# Patient Record
Sex: FEMALE | Race: ASIAN
[De-identification: names, ages, dates, MRNs, and addresses within clinical notes are randomized per-mention and may not be internally consistent; named-entity substitution may affect disease eponyms.]

---

## 2017-01-03 ENCOUNTER — HOSPITAL ENCOUNTER (OUTPATIENT)
Dept: HOSPITAL 67 - LAB | Age: 77
Discharge: HOME | End: 2017-01-03
Attending: FAMILY MEDICINE
Payer: COMMERCIAL

## 2017-01-03 DIAGNOSIS — Z79.01: Primary | ICD-10-CM

## 2017-01-03 DIAGNOSIS — Z51.81: ICD-10-CM

## 2017-01-03 PROCEDURE — 85610 PROTHROMBIN TIME: CPT

## 2017-07-20 ENCOUNTER — HOSPITAL ENCOUNTER (INPATIENT)
Dept: HOSPITAL 67 - ED | Age: 77
LOS: 13 days | Discharge: NURSING, REHAB OR LONG-TERM CARE | DRG: 871 | End: 2017-08-02
Attending: INTERNAL MEDICINE | Admitting: EMERGENCY MEDICINE
Payer: COMMERCIAL

## 2017-07-20 ENCOUNTER — HOSPITAL ENCOUNTER (OUTPATIENT)
Dept: HOSPITAL 67 - AMB | Age: 77
Discharge: ANOTHER HOSPITAL | End: 2017-07-20
Payer: COMMERCIAL

## 2017-07-20 VITALS — SYSTOLIC BLOOD PRESSURE: 161 MMHG | DIASTOLIC BLOOD PRESSURE: 67 MMHG

## 2017-07-20 VITALS — SYSTOLIC BLOOD PRESSURE: 166 MMHG | DIASTOLIC BLOOD PRESSURE: 69 MMHG | TEMPERATURE: 99.9 F

## 2017-07-20 VITALS
HEIGHT: 63 IN | BODY MASS INDEX: 51.91 KG/M2 | BODY MASS INDEX: 51.91 KG/M2 | WEIGHT: 293 LBS | WEIGHT: 293 LBS | HEIGHT: 63 IN

## 2017-07-20 VITALS — SYSTOLIC BLOOD PRESSURE: 156 MMHG | TEMPERATURE: 98.9 F | DIASTOLIC BLOOD PRESSURE: 69 MMHG

## 2017-07-20 VITALS — DIASTOLIC BLOOD PRESSURE: 67 MMHG | SYSTOLIC BLOOD PRESSURE: 127 MMHG

## 2017-07-20 VITALS — SYSTOLIC BLOOD PRESSURE: 139 MMHG | DIASTOLIC BLOOD PRESSURE: 64 MMHG

## 2017-07-20 VITALS — TEMPERATURE: 98.5 F | SYSTOLIC BLOOD PRESSURE: 133 MMHG | DIASTOLIC BLOOD PRESSURE: 58 MMHG

## 2017-07-20 VITALS — DIASTOLIC BLOOD PRESSURE: 55 MMHG | SYSTOLIC BLOOD PRESSURE: 157 MMHG

## 2017-07-20 VITALS — DIASTOLIC BLOOD PRESSURE: 65 MMHG | SYSTOLIC BLOOD PRESSURE: 105 MMHG

## 2017-07-20 VITALS — TEMPERATURE: 98.9 F | SYSTOLIC BLOOD PRESSURE: 126 MMHG | DIASTOLIC BLOOD PRESSURE: 40 MMHG

## 2017-07-20 VITALS — DIASTOLIC BLOOD PRESSURE: 72 MMHG | SYSTOLIC BLOOD PRESSURE: 91 MMHG

## 2017-07-20 VITALS — DIASTOLIC BLOOD PRESSURE: 50 MMHG | SYSTOLIC BLOOD PRESSURE: 114 MMHG

## 2017-07-20 VITALS — DIASTOLIC BLOOD PRESSURE: 59 MMHG | SYSTOLIC BLOOD PRESSURE: 148 MMHG

## 2017-07-20 VITALS — DIASTOLIC BLOOD PRESSURE: 59 MMHG | SYSTOLIC BLOOD PRESSURE: 132 MMHG

## 2017-07-20 VITALS — SYSTOLIC BLOOD PRESSURE: 126 MMHG | DIASTOLIC BLOOD PRESSURE: 40 MMHG

## 2017-07-20 VITALS — SYSTOLIC BLOOD PRESSURE: 159 MMHG | DIASTOLIC BLOOD PRESSURE: 62 MMHG

## 2017-07-20 VITALS — DIASTOLIC BLOOD PRESSURE: 81 MMHG | TEMPERATURE: 98.5 F | SYSTOLIC BLOOD PRESSURE: 135 MMHG

## 2017-07-20 VITALS — DIASTOLIC BLOOD PRESSURE: 55 MMHG | SYSTOLIC BLOOD PRESSURE: 154 MMHG

## 2017-07-20 VITALS — SYSTOLIC BLOOD PRESSURE: 119 MMHG | DIASTOLIC BLOOD PRESSURE: 50 MMHG

## 2017-07-20 VITALS — DIASTOLIC BLOOD PRESSURE: 81 MMHG | SYSTOLIC BLOOD PRESSURE: 144 MMHG

## 2017-07-20 DIAGNOSIS — J96.02: ICD-10-CM

## 2017-07-20 DIAGNOSIS — E87.4: ICD-10-CM

## 2017-07-20 DIAGNOSIS — R53.1: Primary | ICD-10-CM

## 2017-07-20 DIAGNOSIS — L03.116: ICD-10-CM

## 2017-07-20 DIAGNOSIS — D72.828: ICD-10-CM

## 2017-07-20 DIAGNOSIS — E83.42: ICD-10-CM

## 2017-07-20 DIAGNOSIS — I12.9: ICD-10-CM

## 2017-07-20 DIAGNOSIS — E11.40: ICD-10-CM

## 2017-07-20 DIAGNOSIS — N18.3: ICD-10-CM

## 2017-07-20 DIAGNOSIS — E87.5: ICD-10-CM

## 2017-07-20 DIAGNOSIS — L03.115: ICD-10-CM

## 2017-07-20 DIAGNOSIS — I50.9: ICD-10-CM

## 2017-07-20 DIAGNOSIS — M15.8: ICD-10-CM

## 2017-07-20 DIAGNOSIS — N17.8: ICD-10-CM

## 2017-07-20 DIAGNOSIS — E66.01: ICD-10-CM

## 2017-07-20 DIAGNOSIS — I89.0: ICD-10-CM

## 2017-07-20 DIAGNOSIS — B37.2: ICD-10-CM

## 2017-07-20 DIAGNOSIS — D64.89: ICD-10-CM

## 2017-07-20 DIAGNOSIS — M62.82: ICD-10-CM

## 2017-07-20 DIAGNOSIS — E88.09: ICD-10-CM

## 2017-07-20 DIAGNOSIS — E21.2: ICD-10-CM

## 2017-07-20 DIAGNOSIS — E03.8: ICD-10-CM

## 2017-07-20 DIAGNOSIS — A41.89: Primary | ICD-10-CM

## 2017-07-20 LAB
PLATELET # BLD: 246 K/UL (ref 152–353)
POTASSIUM SERPL-SCNC: 6.4 MMOL/L (ref 3.6–5.2)
SODIUM SERPL-SCNC: 134 MMOL/L (ref 136–145)

## 2017-07-20 PROCEDURE — 82728 ASSAY OF FERRITIN: CPT

## 2017-07-20 PROCEDURE — 84100 ASSAY OF PHOSPHORUS: CPT

## 2017-07-20 PROCEDURE — 87088 URINE BACTERIA CULTURE: CPT

## 2017-07-20 PROCEDURE — 83880 ASSAY OF NATRIURETIC PEPTIDE: CPT

## 2017-07-20 PROCEDURE — 84484 ASSAY OF TROPONIN QUANT: CPT

## 2017-07-20 PROCEDURE — 84165 PROTEIN E-PHORESIS SERUM: CPT

## 2017-07-20 PROCEDURE — 82330 ASSAY OF CALCIUM: CPT

## 2017-07-20 PROCEDURE — 84300 ASSAY OF URINE SODIUM: CPT

## 2017-07-20 PROCEDURE — 36415 COLL VENOUS BLD VENIPUNCTURE: CPT

## 2017-07-20 PROCEDURE — 86901 BLOOD TYPING SEROLOGIC RH(D): CPT

## 2017-07-20 PROCEDURE — 96366 THER/PROPH/DIAG IV INF ADDON: CPT

## 2017-07-20 PROCEDURE — 85027 COMPLETE CBC AUTOMATED: CPT

## 2017-07-20 PROCEDURE — 83550 IRON BINDING TEST: CPT

## 2017-07-20 PROCEDURE — 82550 ASSAY OF CK (CPK): CPT

## 2017-07-20 PROCEDURE — P9047 ALBUMIN (HUMAN), 25%, 50ML: HCPCS

## 2017-07-20 PROCEDURE — 96372 THER/PROPH/DIAG INJ SC/IM: CPT

## 2017-07-20 PROCEDURE — 82553 CREATINE MB FRACTION: CPT

## 2017-07-20 PROCEDURE — 36591 DRAW BLOOD OFF VENOUS DEVICE: CPT

## 2017-07-20 PROCEDURE — P9016 RBC LEUKOCYTES REDUCED: HCPCS

## 2017-07-20 PROCEDURE — 99284 EMERGENCY DEPT VISIT MOD MDM: CPT

## 2017-07-20 PROCEDURE — 82962 GLUCOSE BLOOD TEST: CPT

## 2017-07-20 PROCEDURE — 83735 ASSAY OF MAGNESIUM: CPT

## 2017-07-20 PROCEDURE — 82805 BLOOD GASES W/O2 SATURATION: CPT

## 2017-07-20 PROCEDURE — 85730 THROMBOPLASTIN TIME PARTIAL: CPT

## 2017-07-20 PROCEDURE — 87040 BLOOD CULTURE FOR BACTERIA: CPT

## 2017-07-20 PROCEDURE — 82232 ASSAY OF BETA-2 PROTEIN: CPT

## 2017-07-20 PROCEDURE — 87077 CULTURE AEROBIC IDENTIFY: CPT

## 2017-07-20 PROCEDURE — 82570 ASSAY OF URINE CREATININE: CPT

## 2017-07-20 PROCEDURE — C1751 CATH, INF, PER/CENT/MIDLINE: HCPCS

## 2017-07-20 PROCEDURE — 94760 N-INVAS EAR/PLS OXIMETRY 1: CPT

## 2017-07-20 PROCEDURE — A0427 ALS1-EMERGENCY: HCPCS

## 2017-07-20 PROCEDURE — 80048 BASIC METABOLIC PNL TOTAL CA: CPT

## 2017-07-20 PROCEDURE — 82948 REAGENT STRIP/BLOOD GLUCOSE: CPT

## 2017-07-20 PROCEDURE — 82607 VITAMIN B-12: CPT

## 2017-07-20 PROCEDURE — 84443 ASSAY THYROID STIM HORMONE: CPT

## 2017-07-20 PROCEDURE — 84540 ASSAY OF URINE/UREA-N: CPT

## 2017-07-20 PROCEDURE — 82043 UR ALBUMIN QUANTITATIVE: CPT

## 2017-07-20 PROCEDURE — 83540 ASSAY OF IRON: CPT

## 2017-07-20 PROCEDURE — 93005 ELECTROCARDIOGRAM TRACING: CPT

## 2017-07-20 PROCEDURE — 82747 ASSAY OF FOLIC ACID RBC: CPT

## 2017-07-20 PROCEDURE — 82272 OCCULT BLD FECES 1-3 TESTS: CPT

## 2017-07-20 PROCEDURE — 96365 THER/PROPH/DIAG IV INF INIT: CPT

## 2017-07-20 PROCEDURE — 93306 TTE W/DOPPLER COMPLETE: CPT

## 2017-07-20 PROCEDURE — 86850 RBC ANTIBODY SCREEN: CPT

## 2017-07-20 PROCEDURE — 87186 SC STD MICRODIL/AGAR DIL: CPT

## 2017-07-20 PROCEDURE — 84166 PROTEIN E-PHORESIS/URINE/CSF: CPT

## 2017-07-20 PROCEDURE — 86318 IA INFECTIOUS AGENT ANTIBODY: CPT

## 2017-07-20 PROCEDURE — 36600 WITHDRAWAL OF ARTERIAL BLOOD: CPT

## 2017-07-20 PROCEDURE — 85610 PROTHROMBIN TIME: CPT

## 2017-07-20 PROCEDURE — 86922 COMPATIBILITY TEST ANTIGLOB: CPT

## 2017-07-20 PROCEDURE — 83605 ASSAY OF LACTIC ACID: CPT

## 2017-07-20 PROCEDURE — 86900 BLOOD TYPING SEROLOGIC ABO: CPT

## 2017-07-20 PROCEDURE — 87205 SMEAR GRAM STAIN: CPT

## 2017-07-20 PROCEDURE — 87185 SC STD ENZYME DETCJ PER NZM: CPT

## 2017-07-20 PROCEDURE — 83970 ASSAY OF PARATHORMONE: CPT

## 2017-07-20 PROCEDURE — 82306 VITAMIN D 25 HYDROXY: CPT

## 2017-07-20 PROCEDURE — 81000 URINALYSIS NONAUTO W/SCOPE: CPT

## 2017-07-20 PROCEDURE — 80053 COMPREHEN METABOLIC PANEL: CPT

## 2017-07-20 PROCEDURE — A0425 GROUND MILEAGE: HCPCS

## 2017-07-21 VITALS — DIASTOLIC BLOOD PRESSURE: 64 MMHG | TEMPERATURE: 99.1 F | SYSTOLIC BLOOD PRESSURE: 186 MMHG

## 2017-07-21 VITALS — DIASTOLIC BLOOD PRESSURE: 54 MMHG | SYSTOLIC BLOOD PRESSURE: 183 MMHG

## 2017-07-21 VITALS — DIASTOLIC BLOOD PRESSURE: 38 MMHG | SYSTOLIC BLOOD PRESSURE: 99 MMHG

## 2017-07-21 VITALS — DIASTOLIC BLOOD PRESSURE: 51 MMHG | SYSTOLIC BLOOD PRESSURE: 148 MMHG | TEMPERATURE: 98.6 F

## 2017-07-21 VITALS — TEMPERATURE: 98.3 F | DIASTOLIC BLOOD PRESSURE: 80 MMHG | SYSTOLIC BLOOD PRESSURE: 184 MMHG

## 2017-07-21 VITALS — SYSTOLIC BLOOD PRESSURE: 165 MMHG | TEMPERATURE: 100.1 F | DIASTOLIC BLOOD PRESSURE: 45 MMHG

## 2017-07-21 VITALS — DIASTOLIC BLOOD PRESSURE: 87 MMHG | SYSTOLIC BLOOD PRESSURE: 114 MMHG

## 2017-07-21 VITALS — DIASTOLIC BLOOD PRESSURE: 50 MMHG | SYSTOLIC BLOOD PRESSURE: 177 MMHG

## 2017-07-21 LAB
PLATELET # BLD: 189 K/UL (ref 152–353)
POTASSIUM SERPL-SCNC: 5.6 MMOL/L (ref 3.6–5.2)
SODIUM SERPL-SCNC: 134 MMOL/L (ref 136–145)

## 2017-07-22 VITALS — TEMPERATURE: 99.4 F | DIASTOLIC BLOOD PRESSURE: 52 MMHG | SYSTOLIC BLOOD PRESSURE: 157 MMHG

## 2017-07-22 VITALS — DIASTOLIC BLOOD PRESSURE: 60 MMHG | TEMPERATURE: 98.9 F | SYSTOLIC BLOOD PRESSURE: 171 MMHG

## 2017-07-22 VITALS — SYSTOLIC BLOOD PRESSURE: 152 MMHG | DIASTOLIC BLOOD PRESSURE: 64 MMHG | TEMPERATURE: 98.4 F

## 2017-07-22 VITALS — TEMPERATURE: 100.5 F | DIASTOLIC BLOOD PRESSURE: 56 MMHG | SYSTOLIC BLOOD PRESSURE: 175 MMHG

## 2017-07-22 VITALS — SYSTOLIC BLOOD PRESSURE: 162 MMHG | DIASTOLIC BLOOD PRESSURE: 60 MMHG | TEMPERATURE: 98.2 F

## 2017-07-22 VITALS — TEMPERATURE: 97.9 F | DIASTOLIC BLOOD PRESSURE: 67 MMHG | SYSTOLIC BLOOD PRESSURE: 151 MMHG

## 2017-07-22 LAB
PLATELET # BLD: 201 K/UL (ref 152–353)
POTASSIUM SERPL-SCNC: 4.6 MMOL/L (ref 3.6–5.2)
SODIUM SERPL-SCNC: 138 MMOL/L (ref 136–145)

## 2017-07-23 VITALS — DIASTOLIC BLOOD PRESSURE: 68 MMHG | SYSTOLIC BLOOD PRESSURE: 168 MMHG

## 2017-07-23 VITALS — TEMPERATURE: 100.6 F | DIASTOLIC BLOOD PRESSURE: 48 MMHG | SYSTOLIC BLOOD PRESSURE: 157 MMHG

## 2017-07-23 VITALS — DIASTOLIC BLOOD PRESSURE: 73 MMHG | SYSTOLIC BLOOD PRESSURE: 190 MMHG

## 2017-07-23 VITALS — TEMPERATURE: 98.9 F | DIASTOLIC BLOOD PRESSURE: 74 MMHG | SYSTOLIC BLOOD PRESSURE: 181 MMHG

## 2017-07-23 VITALS — SYSTOLIC BLOOD PRESSURE: 176 MMHG | TEMPERATURE: 98.9 F | DIASTOLIC BLOOD PRESSURE: 77 MMHG

## 2017-07-23 VITALS — SYSTOLIC BLOOD PRESSURE: 173 MMHG | DIASTOLIC BLOOD PRESSURE: 93 MMHG

## 2017-07-23 VITALS — DIASTOLIC BLOOD PRESSURE: 68 MMHG | SYSTOLIC BLOOD PRESSURE: 181 MMHG

## 2017-07-23 VITALS — SYSTOLIC BLOOD PRESSURE: 176 MMHG | DIASTOLIC BLOOD PRESSURE: 72 MMHG

## 2017-07-23 VITALS — DIASTOLIC BLOOD PRESSURE: 75 MMHG | SYSTOLIC BLOOD PRESSURE: 176 MMHG

## 2017-07-23 VITALS — SYSTOLIC BLOOD PRESSURE: 165 MMHG | DIASTOLIC BLOOD PRESSURE: 72 MMHG

## 2017-07-23 VITALS — SYSTOLIC BLOOD PRESSURE: 179 MMHG | DIASTOLIC BLOOD PRESSURE: 70 MMHG

## 2017-07-23 VITALS — TEMPERATURE: 100.8 F | SYSTOLIC BLOOD PRESSURE: 189 MMHG | DIASTOLIC BLOOD PRESSURE: 70 MMHG

## 2017-07-23 VITALS — DIASTOLIC BLOOD PRESSURE: 79 MMHG | SYSTOLIC BLOOD PRESSURE: 178 MMHG

## 2017-07-23 VITALS — DIASTOLIC BLOOD PRESSURE: 75 MMHG | SYSTOLIC BLOOD PRESSURE: 163 MMHG | TEMPERATURE: 99.3 F

## 2017-07-23 VITALS — DIASTOLIC BLOOD PRESSURE: 73 MMHG | TEMPERATURE: 100.6 F | SYSTOLIC BLOOD PRESSURE: 176 MMHG

## 2017-07-23 VITALS — DIASTOLIC BLOOD PRESSURE: 72 MMHG | SYSTOLIC BLOOD PRESSURE: 162 MMHG

## 2017-07-23 VITALS — SYSTOLIC BLOOD PRESSURE: 161 MMHG | DIASTOLIC BLOOD PRESSURE: 64 MMHG

## 2017-07-23 VITALS — SYSTOLIC BLOOD PRESSURE: 165 MMHG | DIASTOLIC BLOOD PRESSURE: 74 MMHG

## 2017-07-23 VITALS — SYSTOLIC BLOOD PRESSURE: 176 MMHG | DIASTOLIC BLOOD PRESSURE: 76 MMHG

## 2017-07-23 VITALS — DIASTOLIC BLOOD PRESSURE: 70 MMHG | SYSTOLIC BLOOD PRESSURE: 160 MMHG

## 2017-07-23 VITALS — SYSTOLIC BLOOD PRESSURE: 161 MMHG | DIASTOLIC BLOOD PRESSURE: 70 MMHG

## 2017-07-23 LAB
PLATELET # BLD: 250 K/UL (ref 152–353)
POTASSIUM SERPL-SCNC: 5 MMOL/L (ref 3.6–5.2)
SODIUM SERPL-SCNC: 137 MMOL/L (ref 136–145)

## 2017-07-24 VITALS — DIASTOLIC BLOOD PRESSURE: 71 MMHG | SYSTOLIC BLOOD PRESSURE: 201 MMHG | TEMPERATURE: 100 F

## 2017-07-24 VITALS — DIASTOLIC BLOOD PRESSURE: 93 MMHG | TEMPERATURE: 98.7 F | SYSTOLIC BLOOD PRESSURE: 184 MMHG

## 2017-07-24 VITALS — TEMPERATURE: 99.1 F | DIASTOLIC BLOOD PRESSURE: 90 MMHG | SYSTOLIC BLOOD PRESSURE: 174 MMHG

## 2017-07-24 VITALS — DIASTOLIC BLOOD PRESSURE: 79 MMHG | SYSTOLIC BLOOD PRESSURE: 120 MMHG

## 2017-07-24 VITALS — SYSTOLIC BLOOD PRESSURE: 193 MMHG | DIASTOLIC BLOOD PRESSURE: 74 MMHG

## 2017-07-24 VITALS — SYSTOLIC BLOOD PRESSURE: 187 MMHG | DIASTOLIC BLOOD PRESSURE: 69 MMHG

## 2017-07-24 VITALS — SYSTOLIC BLOOD PRESSURE: 182 MMHG | DIASTOLIC BLOOD PRESSURE: 76 MMHG

## 2017-07-24 VITALS — SYSTOLIC BLOOD PRESSURE: 190 MMHG | DIASTOLIC BLOOD PRESSURE: 76 MMHG

## 2017-07-24 VITALS — SYSTOLIC BLOOD PRESSURE: 171 MMHG | DIASTOLIC BLOOD PRESSURE: 77 MMHG

## 2017-07-24 VITALS — SYSTOLIC BLOOD PRESSURE: 182 MMHG | DIASTOLIC BLOOD PRESSURE: 70 MMHG

## 2017-07-24 VITALS — SYSTOLIC BLOOD PRESSURE: 193 MMHG | DIASTOLIC BLOOD PRESSURE: 85 MMHG | TEMPERATURE: 100 F

## 2017-07-24 VITALS — DIASTOLIC BLOOD PRESSURE: 68 MMHG | SYSTOLIC BLOOD PRESSURE: 181 MMHG | TEMPERATURE: 99.2 F

## 2017-07-24 VITALS — DIASTOLIC BLOOD PRESSURE: 93 MMHG | SYSTOLIC BLOOD PRESSURE: 192 MMHG

## 2017-07-24 VITALS — DIASTOLIC BLOOD PRESSURE: 68 MMHG | SYSTOLIC BLOOD PRESSURE: 181 MMHG

## 2017-07-24 VITALS — SYSTOLIC BLOOD PRESSURE: 203 MMHG | DIASTOLIC BLOOD PRESSURE: 97 MMHG

## 2017-07-24 VITALS — TEMPERATURE: 99.5 F | SYSTOLIC BLOOD PRESSURE: 187 MMHG | DIASTOLIC BLOOD PRESSURE: 76 MMHG

## 2017-07-24 VITALS — SYSTOLIC BLOOD PRESSURE: 168 MMHG | DIASTOLIC BLOOD PRESSURE: 84 MMHG

## 2017-07-24 LAB
APTT BLD: 41.2 SECONDS (ref 24.5–33.6)
PLATELET # BLD: 249 K/UL (ref 152–353)
POTASSIUM SERPL-SCNC: 4.7 MMOL/L (ref 3.6–5.2)
SODIUM SERPL-SCNC: 138 MMOL/L (ref 136–145)

## 2017-07-24 PROCEDURE — 02HV33Z INSERTION OF INFUSION DEVICE INTO SUPERIOR VENA CAVA, PERCUTANEOUS APPROACH: ICD-10-PCS

## 2017-07-25 VITALS — DIASTOLIC BLOOD PRESSURE: 77 MMHG | TEMPERATURE: 99 F | SYSTOLIC BLOOD PRESSURE: 179 MMHG

## 2017-07-25 VITALS — TEMPERATURE: 99.9 F | SYSTOLIC BLOOD PRESSURE: 162 MMHG | DIASTOLIC BLOOD PRESSURE: 61 MMHG

## 2017-07-25 VITALS — TEMPERATURE: 100 F | DIASTOLIC BLOOD PRESSURE: 75 MMHG | SYSTOLIC BLOOD PRESSURE: 194 MMHG

## 2017-07-25 VITALS — TEMPERATURE: 99.4 F | DIASTOLIC BLOOD PRESSURE: 72 MMHG | SYSTOLIC BLOOD PRESSURE: 199 MMHG

## 2017-07-25 VITALS — SYSTOLIC BLOOD PRESSURE: 161 MMHG | DIASTOLIC BLOOD PRESSURE: 98 MMHG

## 2017-07-25 VITALS — SYSTOLIC BLOOD PRESSURE: 183 MMHG | DIASTOLIC BLOOD PRESSURE: 77 MMHG

## 2017-07-25 VITALS — SYSTOLIC BLOOD PRESSURE: 179 MMHG | DIASTOLIC BLOOD PRESSURE: 78 MMHG | TEMPERATURE: 98.6 F

## 2017-07-25 VITALS — TEMPERATURE: 100 F | SYSTOLIC BLOOD PRESSURE: 188 MMHG | DIASTOLIC BLOOD PRESSURE: 70 MMHG

## 2017-07-25 VITALS — SYSTOLIC BLOOD PRESSURE: 177 MMHG | DIASTOLIC BLOOD PRESSURE: 70 MMHG

## 2017-07-25 VITALS — DIASTOLIC BLOOD PRESSURE: 70 MMHG | SYSTOLIC BLOOD PRESSURE: 175 MMHG | TEMPERATURE: 99.2 F

## 2017-07-25 VITALS — DIASTOLIC BLOOD PRESSURE: 77 MMHG | SYSTOLIC BLOOD PRESSURE: 179 MMHG

## 2017-07-25 VITALS — SYSTOLIC BLOOD PRESSURE: 174 MMHG | DIASTOLIC BLOOD PRESSURE: 88 MMHG

## 2017-07-25 LAB
PLATELET # BLD: 258 K/UL (ref 152–353)
POTASSIUM SERPL-SCNC: 4.1 MMOL/L (ref 3.6–5.2)
SODIUM SERPL-SCNC: 137 MMOL/L (ref 136–145)

## 2017-07-26 VITALS — SYSTOLIC BLOOD PRESSURE: 165 MMHG | DIASTOLIC BLOOD PRESSURE: 72 MMHG

## 2017-07-26 VITALS — DIASTOLIC BLOOD PRESSURE: 77 MMHG | SYSTOLIC BLOOD PRESSURE: 148 MMHG

## 2017-07-26 VITALS — SYSTOLIC BLOOD PRESSURE: 169 MMHG | DIASTOLIC BLOOD PRESSURE: 52 MMHG

## 2017-07-26 VITALS — DIASTOLIC BLOOD PRESSURE: 74 MMHG | SYSTOLIC BLOOD PRESSURE: 181 MMHG

## 2017-07-26 VITALS — SYSTOLIC BLOOD PRESSURE: 178 MMHG | TEMPERATURE: 100.1 F | DIASTOLIC BLOOD PRESSURE: 83 MMHG

## 2017-07-26 VITALS — TEMPERATURE: 100.2 F | SYSTOLIC BLOOD PRESSURE: 190 MMHG | DIASTOLIC BLOOD PRESSURE: 76 MMHG

## 2017-07-26 VITALS — SYSTOLIC BLOOD PRESSURE: 187 MMHG | DIASTOLIC BLOOD PRESSURE: 84 MMHG

## 2017-07-26 VITALS — SYSTOLIC BLOOD PRESSURE: 186 MMHG | DIASTOLIC BLOOD PRESSURE: 76 MMHG

## 2017-07-26 VITALS — DIASTOLIC BLOOD PRESSURE: 74 MMHG | SYSTOLIC BLOOD PRESSURE: 171 MMHG

## 2017-07-26 VITALS — SYSTOLIC BLOOD PRESSURE: 189 MMHG | DIASTOLIC BLOOD PRESSURE: 70 MMHG

## 2017-07-26 VITALS — DIASTOLIC BLOOD PRESSURE: 73 MMHG | SYSTOLIC BLOOD PRESSURE: 179 MMHG | TEMPERATURE: 99.6 F

## 2017-07-26 VITALS — SYSTOLIC BLOOD PRESSURE: 203 MMHG | DIASTOLIC BLOOD PRESSURE: 93 MMHG | TEMPERATURE: 100.4 F

## 2017-07-26 VITALS — DIASTOLIC BLOOD PRESSURE: 97 MMHG | SYSTOLIC BLOOD PRESSURE: 188 MMHG | TEMPERATURE: 99.9 F

## 2017-07-26 VITALS — SYSTOLIC BLOOD PRESSURE: 205 MMHG | DIASTOLIC BLOOD PRESSURE: 96 MMHG | TEMPERATURE: 100.4 F

## 2017-07-26 VITALS — TEMPERATURE: 99.5 F | SYSTOLIC BLOOD PRESSURE: 165 MMHG | DIASTOLIC BLOOD PRESSURE: 78 MMHG

## 2017-07-26 VITALS — SYSTOLIC BLOOD PRESSURE: 190 MMHG | DIASTOLIC BLOOD PRESSURE: 95 MMHG

## 2017-07-26 VITALS — DIASTOLIC BLOOD PRESSURE: 68 MMHG | SYSTOLIC BLOOD PRESSURE: 187 MMHG

## 2017-07-26 LAB
PLATELET # BLD: 275 K/UL (ref 152–353)
POTASSIUM SERPL-SCNC: 4.2 MMOL/L (ref 3.6–5.2)
SODIUM SERPL-SCNC: 138 MMOL/L (ref 136–145)

## 2017-07-26 PROCEDURE — 30243N1 TRANSFUSION OF NONAUTOLOGOUS RED BLOOD CELLS INTO CENTRAL VEIN, PERCUTANEOUS APPROACH: ICD-10-PCS | Performed by: INTERNAL MEDICINE

## 2017-07-27 VITALS — SYSTOLIC BLOOD PRESSURE: 105 MMHG | DIASTOLIC BLOOD PRESSURE: 78 MMHG

## 2017-07-27 VITALS — DIASTOLIC BLOOD PRESSURE: 68 MMHG | SYSTOLIC BLOOD PRESSURE: 160 MMHG

## 2017-07-27 VITALS — DIASTOLIC BLOOD PRESSURE: 77 MMHG | SYSTOLIC BLOOD PRESSURE: 192 MMHG

## 2017-07-27 VITALS — DIASTOLIC BLOOD PRESSURE: 81 MMHG | SYSTOLIC BLOOD PRESSURE: 187 MMHG

## 2017-07-27 VITALS — TEMPERATURE: 99.3 F | DIASTOLIC BLOOD PRESSURE: 71 MMHG | SYSTOLIC BLOOD PRESSURE: 194 MMHG

## 2017-07-27 VITALS — DIASTOLIC BLOOD PRESSURE: 84 MMHG | SYSTOLIC BLOOD PRESSURE: 184 MMHG | TEMPERATURE: 99.2 F

## 2017-07-27 VITALS — DIASTOLIC BLOOD PRESSURE: 73 MMHG | TEMPERATURE: 100.1 F | SYSTOLIC BLOOD PRESSURE: 178 MMHG

## 2017-07-27 VITALS — TEMPERATURE: 100.4 F | DIASTOLIC BLOOD PRESSURE: 73 MMHG | SYSTOLIC BLOOD PRESSURE: 183 MMHG

## 2017-07-27 VITALS — DIASTOLIC BLOOD PRESSURE: 84 MMHG | SYSTOLIC BLOOD PRESSURE: 210 MMHG

## 2017-07-27 VITALS — DIASTOLIC BLOOD PRESSURE: 72 MMHG | SYSTOLIC BLOOD PRESSURE: 192 MMHG

## 2017-07-27 VITALS — TEMPERATURE: 99.3 F | DIASTOLIC BLOOD PRESSURE: 89 MMHG | SYSTOLIC BLOOD PRESSURE: 106 MMHG

## 2017-07-27 VITALS — SYSTOLIC BLOOD PRESSURE: 141 MMHG | DIASTOLIC BLOOD PRESSURE: 126 MMHG

## 2017-07-27 VITALS — SYSTOLIC BLOOD PRESSURE: 174 MMHG | DIASTOLIC BLOOD PRESSURE: 93 MMHG

## 2017-07-27 VITALS — DIASTOLIC BLOOD PRESSURE: 91 MMHG | SYSTOLIC BLOOD PRESSURE: 179 MMHG

## 2017-07-27 VITALS — TEMPERATURE: 99 F | SYSTOLIC BLOOD PRESSURE: 160 MMHG | DIASTOLIC BLOOD PRESSURE: 79 MMHG

## 2017-07-27 VITALS — DIASTOLIC BLOOD PRESSURE: 94 MMHG | SYSTOLIC BLOOD PRESSURE: 198 MMHG

## 2017-07-27 VITALS — SYSTOLIC BLOOD PRESSURE: 146 MMHG | DIASTOLIC BLOOD PRESSURE: 90 MMHG

## 2017-07-27 VITALS — DIASTOLIC BLOOD PRESSURE: 63 MMHG | SYSTOLIC BLOOD PRESSURE: 106 MMHG

## 2017-07-27 VITALS — DIASTOLIC BLOOD PRESSURE: 83 MMHG | SYSTOLIC BLOOD PRESSURE: 186 MMHG

## 2017-07-27 VITALS — DIASTOLIC BLOOD PRESSURE: 89 MMHG | SYSTOLIC BLOOD PRESSURE: 196 MMHG

## 2017-07-27 VITALS — DIASTOLIC BLOOD PRESSURE: 78 MMHG | SYSTOLIC BLOOD PRESSURE: 168 MMHG

## 2017-07-27 LAB
PLATELET # BLD: 268 K/UL (ref 152–353)
POTASSIUM SERPL-SCNC: 3.6 MMOL/L (ref 3.6–5.2)
SODIUM SERPL-SCNC: 139 MMOL/L (ref 136–145)

## 2017-07-28 VITALS — TEMPERATURE: 97.8 F | SYSTOLIC BLOOD PRESSURE: 162 MMHG | DIASTOLIC BLOOD PRESSURE: 56 MMHG

## 2017-07-28 VITALS — DIASTOLIC BLOOD PRESSURE: 71 MMHG | SYSTOLIC BLOOD PRESSURE: 173 MMHG

## 2017-07-28 VITALS — SYSTOLIC BLOOD PRESSURE: 155 MMHG | DIASTOLIC BLOOD PRESSURE: 59 MMHG

## 2017-07-28 VITALS — SYSTOLIC BLOOD PRESSURE: 162 MMHG | DIASTOLIC BLOOD PRESSURE: 103 MMHG

## 2017-07-28 VITALS — DIASTOLIC BLOOD PRESSURE: 87 MMHG | SYSTOLIC BLOOD PRESSURE: 162 MMHG

## 2017-07-28 VITALS — DIASTOLIC BLOOD PRESSURE: 103 MMHG | SYSTOLIC BLOOD PRESSURE: 177 MMHG

## 2017-07-28 VITALS — TEMPERATURE: 96.5 F | DIASTOLIC BLOOD PRESSURE: 88 MMHG | SYSTOLIC BLOOD PRESSURE: 178 MMHG

## 2017-07-28 VITALS — SYSTOLIC BLOOD PRESSURE: 138 MMHG | DIASTOLIC BLOOD PRESSURE: 58 MMHG

## 2017-07-28 VITALS — SYSTOLIC BLOOD PRESSURE: 159 MMHG | DIASTOLIC BLOOD PRESSURE: 80 MMHG | TEMPERATURE: 100.6 F

## 2017-07-28 VITALS — DIASTOLIC BLOOD PRESSURE: 64 MMHG | SYSTOLIC BLOOD PRESSURE: 143 MMHG

## 2017-07-28 VITALS — DIASTOLIC BLOOD PRESSURE: 59 MMHG | SYSTOLIC BLOOD PRESSURE: 140 MMHG

## 2017-07-28 VITALS — DIASTOLIC BLOOD PRESSURE: 81 MMHG | TEMPERATURE: 100.1 F | SYSTOLIC BLOOD PRESSURE: 189 MMHG

## 2017-07-28 VITALS — SYSTOLIC BLOOD PRESSURE: 146 MMHG | DIASTOLIC BLOOD PRESSURE: 63 MMHG

## 2017-07-28 VITALS — DIASTOLIC BLOOD PRESSURE: 70 MMHG | SYSTOLIC BLOOD PRESSURE: 157 MMHG | TEMPERATURE: 97 F

## 2017-07-28 VITALS — DIASTOLIC BLOOD PRESSURE: 68 MMHG | SYSTOLIC BLOOD PRESSURE: 106 MMHG

## 2017-07-28 VITALS — SYSTOLIC BLOOD PRESSURE: 140 MMHG | DIASTOLIC BLOOD PRESSURE: 64 MMHG

## 2017-07-28 VITALS — SYSTOLIC BLOOD PRESSURE: 171 MMHG | DIASTOLIC BLOOD PRESSURE: 62 MMHG

## 2017-07-28 VITALS — SYSTOLIC BLOOD PRESSURE: 162 MMHG | DIASTOLIC BLOOD PRESSURE: 58 MMHG

## 2017-07-28 VITALS — SYSTOLIC BLOOD PRESSURE: 168 MMHG | DIASTOLIC BLOOD PRESSURE: 69 MMHG

## 2017-07-28 VITALS — SYSTOLIC BLOOD PRESSURE: 184 MMHG | DIASTOLIC BLOOD PRESSURE: 99 MMHG

## 2017-07-28 LAB
PLATELET # BLD: 277 K/UL (ref 152–353)
POTASSIUM SERPL-SCNC: 3.3 MMOL/L (ref 3.6–5.2)
SODIUM SERPL-SCNC: 145 MMOL/L (ref 136–145)

## 2017-07-29 VITALS — DIASTOLIC BLOOD PRESSURE: 60 MMHG | SYSTOLIC BLOOD PRESSURE: 145 MMHG

## 2017-07-29 VITALS — SYSTOLIC BLOOD PRESSURE: 143 MMHG | DIASTOLIC BLOOD PRESSURE: 90 MMHG

## 2017-07-29 VITALS — DIASTOLIC BLOOD PRESSURE: 76 MMHG | SYSTOLIC BLOOD PRESSURE: 178 MMHG

## 2017-07-29 VITALS — DIASTOLIC BLOOD PRESSURE: 64 MMHG | SYSTOLIC BLOOD PRESSURE: 160 MMHG

## 2017-07-29 VITALS — SYSTOLIC BLOOD PRESSURE: 175 MMHG | TEMPERATURE: 98 F | DIASTOLIC BLOOD PRESSURE: 89 MMHG

## 2017-07-29 VITALS — DIASTOLIC BLOOD PRESSURE: 65 MMHG | SYSTOLIC BLOOD PRESSURE: 171 MMHG | TEMPERATURE: 99 F

## 2017-07-29 VITALS — SYSTOLIC BLOOD PRESSURE: 182 MMHG | DIASTOLIC BLOOD PRESSURE: 83 MMHG

## 2017-07-29 VITALS — SYSTOLIC BLOOD PRESSURE: 149 MMHG | DIASTOLIC BLOOD PRESSURE: 77 MMHG

## 2017-07-29 VITALS — SYSTOLIC BLOOD PRESSURE: 185 MMHG | DIASTOLIC BLOOD PRESSURE: 73 MMHG

## 2017-07-29 VITALS — TEMPERATURE: 98.8 F | DIASTOLIC BLOOD PRESSURE: 65 MMHG | SYSTOLIC BLOOD PRESSURE: 182 MMHG

## 2017-07-29 VITALS — SYSTOLIC BLOOD PRESSURE: 150 MMHG | DIASTOLIC BLOOD PRESSURE: 57 MMHG

## 2017-07-29 VITALS — SYSTOLIC BLOOD PRESSURE: 173 MMHG | DIASTOLIC BLOOD PRESSURE: 76 MMHG

## 2017-07-29 VITALS — SYSTOLIC BLOOD PRESSURE: 180 MMHG | DIASTOLIC BLOOD PRESSURE: 78 MMHG

## 2017-07-29 VITALS — DIASTOLIC BLOOD PRESSURE: 67 MMHG | SYSTOLIC BLOOD PRESSURE: 175 MMHG

## 2017-07-29 VITALS — SYSTOLIC BLOOD PRESSURE: 158 MMHG | DIASTOLIC BLOOD PRESSURE: 63 MMHG

## 2017-07-29 VITALS — DIASTOLIC BLOOD PRESSURE: 68 MMHG | SYSTOLIC BLOOD PRESSURE: 164 MMHG

## 2017-07-29 VITALS — DIASTOLIC BLOOD PRESSURE: 76 MMHG | SYSTOLIC BLOOD PRESSURE: 170 MMHG

## 2017-07-29 VITALS — DIASTOLIC BLOOD PRESSURE: 80 MMHG | SYSTOLIC BLOOD PRESSURE: 163 MMHG

## 2017-07-29 VITALS — TEMPERATURE: 99 F | SYSTOLIC BLOOD PRESSURE: 167 MMHG | DIASTOLIC BLOOD PRESSURE: 61 MMHG

## 2017-07-29 VITALS — SYSTOLIC BLOOD PRESSURE: 165 MMHG | DIASTOLIC BLOOD PRESSURE: 80 MMHG

## 2017-07-29 VITALS — DIASTOLIC BLOOD PRESSURE: 80 MMHG | SYSTOLIC BLOOD PRESSURE: 182 MMHG

## 2017-07-29 LAB
PLATELET # BLD: 255 K/UL (ref 152–353)
POTASSIUM SERPL-SCNC: 3.1 MMOL/L (ref 3.6–5.2)
SODIUM SERPL-SCNC: 144 MMOL/L (ref 136–145)

## 2017-07-30 VITALS — TEMPERATURE: 98.7 F | SYSTOLIC BLOOD PRESSURE: 183 MMHG | DIASTOLIC BLOOD PRESSURE: 59 MMHG

## 2017-07-30 VITALS — TEMPERATURE: 98.7 F | SYSTOLIC BLOOD PRESSURE: 178 MMHG | DIASTOLIC BLOOD PRESSURE: 85 MMHG

## 2017-07-30 VITALS — TEMPERATURE: 98.4 F | DIASTOLIC BLOOD PRESSURE: 66 MMHG | SYSTOLIC BLOOD PRESSURE: 191 MMHG

## 2017-07-30 VITALS — DIASTOLIC BLOOD PRESSURE: 75 MMHG | SYSTOLIC BLOOD PRESSURE: 179 MMHG

## 2017-07-30 VITALS — DIASTOLIC BLOOD PRESSURE: 66 MMHG | TEMPERATURE: 99.1 F | SYSTOLIC BLOOD PRESSURE: 169 MMHG

## 2017-07-30 VITALS — DIASTOLIC BLOOD PRESSURE: 57 MMHG | TEMPERATURE: 98.4 F | SYSTOLIC BLOOD PRESSURE: 157 MMHG

## 2017-07-30 VITALS — SYSTOLIC BLOOD PRESSURE: 173 MMHG | DIASTOLIC BLOOD PRESSURE: 89 MMHG

## 2017-07-30 VITALS — SYSTOLIC BLOOD PRESSURE: 169 MMHG | DIASTOLIC BLOOD PRESSURE: 66 MMHG

## 2017-07-30 VITALS — SYSTOLIC BLOOD PRESSURE: 139 MMHG | DIASTOLIC BLOOD PRESSURE: 76 MMHG

## 2017-07-30 LAB
PLATELET # BLD: 299 K/UL (ref 152–353)
POTASSIUM SERPL-SCNC: 3.1 MMOL/L (ref 3.6–5.2)
SODIUM SERPL-SCNC: 142 MMOL/L (ref 136–145)

## 2017-07-31 VITALS — DIASTOLIC BLOOD PRESSURE: 55 MMHG | SYSTOLIC BLOOD PRESSURE: 156 MMHG | TEMPERATURE: 99.4 F

## 2017-07-31 VITALS — DIASTOLIC BLOOD PRESSURE: 63 MMHG | TEMPERATURE: 99.3 F | SYSTOLIC BLOOD PRESSURE: 184 MMHG

## 2017-07-31 VITALS — SYSTOLIC BLOOD PRESSURE: 168 MMHG | DIASTOLIC BLOOD PRESSURE: 59 MMHG | TEMPERATURE: 99.2 F

## 2017-07-31 VITALS — TEMPERATURE: 98.8 F | SYSTOLIC BLOOD PRESSURE: 174 MMHG | DIASTOLIC BLOOD PRESSURE: 52 MMHG

## 2017-07-31 VITALS — SYSTOLIC BLOOD PRESSURE: 182 MMHG | DIASTOLIC BLOOD PRESSURE: 61 MMHG | TEMPERATURE: 98.9 F

## 2017-07-31 VITALS — DIASTOLIC BLOOD PRESSURE: 74 MMHG | SYSTOLIC BLOOD PRESSURE: 187 MMHG | TEMPERATURE: 99.3 F

## 2017-07-31 LAB
PLATELET # BLD: 284 K/UL (ref 152–353)
POTASSIUM SERPL-SCNC: 3 MMOL/L (ref 3.6–5.2)
SODIUM SERPL-SCNC: 142 MMOL/L (ref 136–145)

## 2017-08-01 VITALS — SYSTOLIC BLOOD PRESSURE: 168 MMHG | DIASTOLIC BLOOD PRESSURE: 57 MMHG | TEMPERATURE: 99.1 F

## 2017-08-01 VITALS — SYSTOLIC BLOOD PRESSURE: 159 MMHG | DIASTOLIC BLOOD PRESSURE: 66 MMHG | TEMPERATURE: 98 F

## 2017-08-01 VITALS — SYSTOLIC BLOOD PRESSURE: 192 MMHG | TEMPERATURE: 97.8 F | DIASTOLIC BLOOD PRESSURE: 97 MMHG

## 2017-08-01 VITALS — DIASTOLIC BLOOD PRESSURE: 62 MMHG | SYSTOLIC BLOOD PRESSURE: 190 MMHG | TEMPERATURE: 99.4 F

## 2017-08-01 VITALS — DIASTOLIC BLOOD PRESSURE: 72 MMHG | TEMPERATURE: 98 F | SYSTOLIC BLOOD PRESSURE: 136 MMHG

## 2017-08-01 VITALS — SYSTOLIC BLOOD PRESSURE: 183 MMHG | TEMPERATURE: 98.9 F | DIASTOLIC BLOOD PRESSURE: 58 MMHG

## 2017-08-01 LAB
PLATELET # BLD: 336 K/UL (ref 152–353)
POTASSIUM SERPL-SCNC: 2.9 MMOL/L (ref 3.6–5.2)
SODIUM SERPL-SCNC: 142 MMOL/L (ref 136–145)

## 2017-08-02 ENCOUNTER — HOSPITAL ENCOUNTER (INPATIENT)
Dept: HOSPITAL 67 - MED/SURG | Age: 77
LOS: 7 days | Discharge: HOME HEALTH | DRG: 556 | End: 2017-08-09
Attending: INTERNAL MEDICINE | Admitting: INTERNAL MEDICINE
Payer: COMMERCIAL

## 2017-08-02 VITALS
SYSTOLIC BLOOD PRESSURE: 161 MMHG | SYSTOLIC BLOOD PRESSURE: 113 MMHG | DIASTOLIC BLOOD PRESSURE: 69 MMHG | TEMPERATURE: 99.1 F | TEMPERATURE: 99.9 F | DIASTOLIC BLOOD PRESSURE: 59 MMHG

## 2017-08-02 VITALS — TEMPERATURE: 98 F | SYSTOLIC BLOOD PRESSURE: 190 MMHG | DIASTOLIC BLOOD PRESSURE: 65 MMHG

## 2017-08-02 VITALS
BODY MASS INDEX: 51.91 KG/M2 | BODY MASS INDEX: 51.91 KG/M2 | WEIGHT: 293 LBS | HEIGHT: 63 IN | WEIGHT: 293 LBS | HEIGHT: 63 IN

## 2017-08-02 VITALS — SYSTOLIC BLOOD PRESSURE: 198 MMHG | DIASTOLIC BLOOD PRESSURE: 77 MMHG | TEMPERATURE: 97.8 F

## 2017-08-02 VITALS — TEMPERATURE: 99.6 F | DIASTOLIC BLOOD PRESSURE: 64 MMHG | SYSTOLIC BLOOD PRESSURE: 142 MMHG

## 2017-08-02 VITALS — TEMPERATURE: 98.3 F | DIASTOLIC BLOOD PRESSURE: 76 MMHG | SYSTOLIC BLOOD PRESSURE: 153 MMHG

## 2017-08-02 DIAGNOSIS — I89.0: ICD-10-CM

## 2017-08-02 DIAGNOSIS — M62.81: Primary | ICD-10-CM

## 2017-08-02 DIAGNOSIS — L03.115: ICD-10-CM

## 2017-08-02 DIAGNOSIS — I50.9: ICD-10-CM

## 2017-08-02 DIAGNOSIS — N18.3: ICD-10-CM

## 2017-08-02 DIAGNOSIS — L03.116: ICD-10-CM

## 2017-08-02 LAB
PLATELET # BLD: 337 K/UL (ref 152–353)
POTASSIUM SERPL-SCNC: 3.1 MMOL/L (ref 3.6–5.2)
SODIUM SERPL-SCNC: 143 MMOL/L (ref 136–145)

## 2017-08-02 PROCEDURE — 83735 ASSAY OF MAGNESIUM: CPT

## 2017-08-02 PROCEDURE — 82948 REAGENT STRIP/BLOOD GLUCOSE: CPT

## 2017-08-02 PROCEDURE — 85027 COMPLETE CBC AUTOMATED: CPT

## 2017-08-02 PROCEDURE — 36415 COLL VENOUS BLD VENIPUNCTURE: CPT

## 2017-08-02 PROCEDURE — 94760 N-INVAS EAR/PLS OXIMETRY 1: CPT

## 2017-08-02 PROCEDURE — 85610 PROTHROMBIN TIME: CPT

## 2017-08-02 PROCEDURE — 80053 COMPREHEN METABOLIC PANEL: CPT

## 2017-08-03 VITALS — SYSTOLIC BLOOD PRESSURE: 3 MMHG | TEMPERATURE: 99.1 F | DIASTOLIC BLOOD PRESSURE: 69 MMHG

## 2017-08-03 VITALS — DIASTOLIC BLOOD PRESSURE: 70 MMHG | TEMPERATURE: 98.4 F | SYSTOLIC BLOOD PRESSURE: 185 MMHG

## 2017-08-04 VITALS — SYSTOLIC BLOOD PRESSURE: 171 MMHG | TEMPERATURE: 98.4 F | DIASTOLIC BLOOD PRESSURE: 75 MMHG

## 2017-08-04 VITALS — SYSTOLIC BLOOD PRESSURE: 179 MMHG | DIASTOLIC BLOOD PRESSURE: 64 MMHG | TEMPERATURE: 99 F

## 2017-08-05 VITALS — SYSTOLIC BLOOD PRESSURE: 174 MMHG | DIASTOLIC BLOOD PRESSURE: 63 MMHG | TEMPERATURE: 98.8 F

## 2017-08-05 VITALS — TEMPERATURE: 98.7 F | DIASTOLIC BLOOD PRESSURE: 65 MMHG | SYSTOLIC BLOOD PRESSURE: 192 MMHG

## 2017-08-07 VITALS — SYSTOLIC BLOOD PRESSURE: 180 MMHG | DIASTOLIC BLOOD PRESSURE: 58 MMHG | TEMPERATURE: 99 F

## 2017-08-07 VITALS — SYSTOLIC BLOOD PRESSURE: 165 MMHG | DIASTOLIC BLOOD PRESSURE: 50 MMHG | TEMPERATURE: 98.6 F

## 2017-08-07 LAB
PLATELET # BLD: 335 K/UL (ref 152–353)
POTASSIUM SERPL-SCNC: 5.4 MMOL/L (ref 3.6–5.2)
SODIUM SERPL-SCNC: 135 MMOL/L (ref 136–145)

## 2017-08-08 VITALS — DIASTOLIC BLOOD PRESSURE: 77 MMHG | TEMPERATURE: 98.5 F | SYSTOLIC BLOOD PRESSURE: 134 MMHG

## 2017-08-08 VITALS — DIASTOLIC BLOOD PRESSURE: 68 MMHG | SYSTOLIC BLOOD PRESSURE: 176 MMHG | TEMPERATURE: 98.6 F

## 2017-08-09 LAB
PLATELET # BLD: 281 K/UL (ref 152–353)
POTASSIUM SERPL-SCNC: 4.7 MMOL/L (ref 3.6–5.2)
SODIUM SERPL-SCNC: 139 MMOL/L (ref 136–145)

## 2017-08-11 ENCOUNTER — HOSPITAL ENCOUNTER (OUTPATIENT)
Dept: HOSPITAL 67 - LAB | Age: 77
Discharge: HOME | End: 2017-08-11
Attending: FAMILY MEDICINE
Payer: COMMERCIAL

## 2017-08-11 DIAGNOSIS — N18.3: ICD-10-CM

## 2017-08-11 DIAGNOSIS — Z79.01: Primary | ICD-10-CM

## 2017-08-11 DIAGNOSIS — E87.5: ICD-10-CM

## 2017-08-11 DIAGNOSIS — E11.9: ICD-10-CM

## 2017-08-11 PROCEDURE — 83735 ASSAY OF MAGNESIUM: CPT

## 2017-08-11 PROCEDURE — 80048 BASIC METABOLIC PNL TOTAL CA: CPT

## 2017-08-11 PROCEDURE — 85014 HEMATOCRIT: CPT

## 2017-08-11 PROCEDURE — 85610 PROTHROMBIN TIME: CPT

## 2017-08-11 PROCEDURE — 85018 HEMOGLOBIN: CPT

## 2017-08-12 LAB
POTASSIUM SERPL-SCNC: 4.5 MMOL/L (ref 3.6–5.2)
SODIUM SERPL-SCNC: 135 MMOL/L (ref 136–145)

## 2017-10-05 ENCOUNTER — HOSPITAL ENCOUNTER (OUTPATIENT)
Dept: HOSPITAL 67 - AMB | Age: 77
Discharge: ANOTHER HOSPITAL | End: 2017-10-05
Payer: COMMERCIAL

## 2017-10-05 ENCOUNTER — HOSPITAL ENCOUNTER (INPATIENT)
Dept: HOSPITAL 67 - ED | Age: 77
LOS: 3 days | Discharge: HOME | DRG: 602 | End: 2017-10-08
Payer: COMMERCIAL

## 2017-10-05 VITALS
WEIGHT: 293 LBS | HEIGHT: 63 IN | BODY MASS INDEX: 51.91 KG/M2 | BODY MASS INDEX: 51.91 KG/M2 | HEIGHT: 63 IN | WEIGHT: 293 LBS

## 2017-10-05 VITALS — SYSTOLIC BLOOD PRESSURE: 224 MMHG | TEMPERATURE: 98.5 F | DIASTOLIC BLOOD PRESSURE: 83 MMHG

## 2017-10-05 VITALS — DIASTOLIC BLOOD PRESSURE: 54 MMHG | SYSTOLIC BLOOD PRESSURE: 167 MMHG | TEMPERATURE: 101.2 F

## 2017-10-05 DIAGNOSIS — R06.02: ICD-10-CM

## 2017-10-05 DIAGNOSIS — I50.33: ICD-10-CM

## 2017-10-05 DIAGNOSIS — L03.115: Primary | ICD-10-CM

## 2017-10-05 DIAGNOSIS — E87.5: ICD-10-CM

## 2017-10-05 DIAGNOSIS — B99.8: ICD-10-CM

## 2017-10-05 DIAGNOSIS — D64.89: ICD-10-CM

## 2017-10-05 DIAGNOSIS — E83.42: ICD-10-CM

## 2017-10-05 DIAGNOSIS — E11.9: ICD-10-CM

## 2017-10-05 DIAGNOSIS — R06.02: Primary | ICD-10-CM

## 2017-10-05 DIAGNOSIS — E66.01: ICD-10-CM

## 2017-10-05 DIAGNOSIS — R07.89: ICD-10-CM

## 2017-10-05 DIAGNOSIS — N18.4: ICD-10-CM

## 2017-10-05 LAB
APTT BLD: 43 SECONDS (ref 24.5–33.6)
PLATELET # BLD: 188 K/UL (ref 152–353)
POTASSIUM SERPL-SCNC: 5.8 MMOL/L (ref 3.6–5.2)
SODIUM SERPL-SCNC: 130 MMOL/L (ref 136–145)

## 2017-10-05 PROCEDURE — 82550 ASSAY OF CK (CPK): CPT

## 2017-10-05 PROCEDURE — 86922 COMPATIBILITY TEST ANTIGLOB: CPT

## 2017-10-05 PROCEDURE — 87040 BLOOD CULTURE FOR BACTERIA: CPT

## 2017-10-05 PROCEDURE — 96367 TX/PROPH/DG ADDL SEQ IV INF: CPT

## 2017-10-05 PROCEDURE — 85379 FIBRIN DEGRADATION QUANT: CPT

## 2017-10-05 PROCEDURE — 83605 ASSAY OF LACTIC ACID: CPT

## 2017-10-05 PROCEDURE — 82948 REAGENT STRIP/BLOOD GLUCOSE: CPT

## 2017-10-05 PROCEDURE — 83735 ASSAY OF MAGNESIUM: CPT

## 2017-10-05 PROCEDURE — P9016 RBC LEUKOCYTES REDUCED: HCPCS

## 2017-10-05 PROCEDURE — 87077 CULTURE AEROBIC IDENTIFY: CPT

## 2017-10-05 PROCEDURE — 85730 THROMBOPLASTIN TIME PARTIAL: CPT

## 2017-10-05 PROCEDURE — A0425 GROUND MILEAGE: HCPCS

## 2017-10-05 PROCEDURE — 36415 COLL VENOUS BLD VENIPUNCTURE: CPT

## 2017-10-05 PROCEDURE — 86900 BLOOD TYPING SEROLOGIC ABO: CPT

## 2017-10-05 PROCEDURE — 84484 ASSAY OF TROPONIN QUANT: CPT

## 2017-10-05 PROCEDURE — 83880 ASSAY OF NATRIURETIC PEPTIDE: CPT

## 2017-10-05 PROCEDURE — 82805 BLOOD GASES W/O2 SATURATION: CPT

## 2017-10-05 PROCEDURE — 86901 BLOOD TYPING SEROLOGIC RH(D): CPT

## 2017-10-05 PROCEDURE — 80053 COMPREHEN METABOLIC PANEL: CPT

## 2017-10-05 PROCEDURE — 87205 SMEAR GRAM STAIN: CPT

## 2017-10-05 PROCEDURE — 86850 RBC ANTIBODY SCREEN: CPT

## 2017-10-05 PROCEDURE — 87185 SC STD ENZYME DETCJ PER NZM: CPT

## 2017-10-05 PROCEDURE — 93005 ELECTROCARDIOGRAM TRACING: CPT

## 2017-10-05 PROCEDURE — 96375 TX/PRO/DX INJ NEW DRUG ADDON: CPT

## 2017-10-05 PROCEDURE — 36600 WITHDRAWAL OF ARTERIAL BLOOD: CPT

## 2017-10-05 PROCEDURE — 99283 EMERGENCY DEPT VISIT LOW MDM: CPT

## 2017-10-05 PROCEDURE — 94760 N-INVAS EAR/PLS OXIMETRY 1: CPT

## 2017-10-05 PROCEDURE — 82553 CREATINE MB FRACTION: CPT

## 2017-10-05 PROCEDURE — 87186 SC STD MICRODIL/AGAR DIL: CPT

## 2017-10-05 PROCEDURE — 85610 PROTHROMBIN TIME: CPT

## 2017-10-05 PROCEDURE — 85027 COMPLETE CBC AUTOMATED: CPT

## 2017-10-05 PROCEDURE — 36591 DRAW BLOOD OFF VENOUS DEVICE: CPT

## 2017-10-05 PROCEDURE — 81000 URINALYSIS NONAUTO W/SCOPE: CPT

## 2017-10-05 PROCEDURE — A0427 ALS1-EMERGENCY: HCPCS

## 2017-10-06 VITALS — DIASTOLIC BLOOD PRESSURE: 79 MMHG | TEMPERATURE: 101.1 F | SYSTOLIC BLOOD PRESSURE: 139 MMHG

## 2017-10-06 VITALS — SYSTOLIC BLOOD PRESSURE: 198 MMHG | TEMPERATURE: 99.2 F | DIASTOLIC BLOOD PRESSURE: 63 MMHG

## 2017-10-06 VITALS — DIASTOLIC BLOOD PRESSURE: 63 MMHG | SYSTOLIC BLOOD PRESSURE: 190 MMHG | TEMPERATURE: 99.2 F

## 2017-10-06 VITALS — TEMPERATURE: 100.2 F | SYSTOLIC BLOOD PRESSURE: 142 MMHG | DIASTOLIC BLOOD PRESSURE: 61 MMHG

## 2017-10-06 VITALS — SYSTOLIC BLOOD PRESSURE: 165 MMHG | DIASTOLIC BLOOD PRESSURE: 56 MMHG | TEMPERATURE: 98.8 F

## 2017-10-06 VITALS — SYSTOLIC BLOOD PRESSURE: 183 MMHG | TEMPERATURE: 98.8 F | DIASTOLIC BLOOD PRESSURE: 58 MMHG

## 2017-10-06 LAB
PLATELET # BLD: 133 K/UL (ref 152–353)
POTASSIUM SERPL-SCNC: 6.1 MMOL/L (ref 3.6–5.2)
SODIUM SERPL-SCNC: 135 MMOL/L (ref 136–145)

## 2017-10-06 PROCEDURE — 30233N1 TRANSFUSION OF NONAUTOLOGOUS RED BLOOD CELLS INTO PERIPHERAL VEIN, PERCUTANEOUS APPROACH: ICD-10-PCS

## 2017-10-07 VITALS — DIASTOLIC BLOOD PRESSURE: 60 MMHG | TEMPERATURE: 98.8 F | SYSTOLIC BLOOD PRESSURE: 172 MMHG

## 2017-10-07 VITALS — DIASTOLIC BLOOD PRESSURE: 79 MMHG | SYSTOLIC BLOOD PRESSURE: 170 MMHG | TEMPERATURE: 99 F

## 2017-10-07 VITALS — SYSTOLIC BLOOD PRESSURE: 178 MMHG | TEMPERATURE: 98.9 F | DIASTOLIC BLOOD PRESSURE: 48 MMHG

## 2017-10-07 VITALS — DIASTOLIC BLOOD PRESSURE: 66 MMHG | TEMPERATURE: 98.5 F | SYSTOLIC BLOOD PRESSURE: 171 MMHG

## 2017-10-07 VITALS — TEMPERATURE: 99.2 F | SYSTOLIC BLOOD PRESSURE: 193 MMHG | DIASTOLIC BLOOD PRESSURE: 69 MMHG

## 2017-10-07 VITALS — TEMPERATURE: 99.9 F | SYSTOLIC BLOOD PRESSURE: 198 MMHG | DIASTOLIC BLOOD PRESSURE: 63 MMHG

## 2017-10-07 LAB
PLATELET # BLD: 142 K/UL (ref 152–353)
POTASSIUM SERPL-SCNC: 5.4 MMOL/L (ref 3.6–5.2)
SODIUM SERPL-SCNC: 134 MMOL/L (ref 136–145)

## 2017-10-08 VITALS — DIASTOLIC BLOOD PRESSURE: 62 MMHG | TEMPERATURE: 99 F | SYSTOLIC BLOOD PRESSURE: 180 MMHG

## 2017-10-08 VITALS — SYSTOLIC BLOOD PRESSURE: 170 MMHG | TEMPERATURE: 98.4 F | DIASTOLIC BLOOD PRESSURE: 68 MMHG

## 2017-10-08 LAB
PLATELET # BLD: 116 K/UL (ref 152–353)
POTASSIUM SERPL-SCNC: 4.1 MMOL/L (ref 3.6–5.2)
SODIUM SERPL-SCNC: 137 MMOL/L (ref 136–145)

## 2017-10-17 ENCOUNTER — HOSPITAL ENCOUNTER (OUTPATIENT)
Dept: HOSPITAL 67 - LAB | Age: 77
Discharge: HOME | End: 2017-10-17
Attending: INTERNAL MEDICINE
Payer: COMMERCIAL

## 2017-10-17 DIAGNOSIS — I50.9: Primary | ICD-10-CM

## 2017-10-17 DIAGNOSIS — I48.0: ICD-10-CM

## 2017-10-17 DIAGNOSIS — E11.9: ICD-10-CM

## 2017-10-17 DIAGNOSIS — N39.0: ICD-10-CM

## 2017-10-17 DIAGNOSIS — N18.4: ICD-10-CM

## 2017-10-17 LAB
PLATELET # BLD: 265 K/UL (ref 152–353)
POTASSIUM SERPL-SCNC: 4.2 MMOL/L (ref 3.6–5.2)
SODIUM SERPL-SCNC: 137 MMOL/L (ref 136–145)

## 2017-10-17 PROCEDURE — 84100 ASSAY OF PHOSPHORUS: CPT

## 2017-10-17 PROCEDURE — 87186 SC STD MICRODIL/AGAR DIL: CPT

## 2017-10-17 PROCEDURE — 81000 URINALYSIS NONAUTO W/SCOPE: CPT

## 2017-10-17 PROCEDURE — 85027 COMPLETE CBC AUTOMATED: CPT

## 2017-10-17 PROCEDURE — 85007 BL SMEAR W/DIFF WBC COUNT: CPT

## 2017-10-17 PROCEDURE — 83970 ASSAY OF PARATHORMONE: CPT

## 2017-10-17 PROCEDURE — 87088 URINE BACTERIA CULTURE: CPT

## 2017-10-17 PROCEDURE — 87077 CULTURE AEROBIC IDENTIFY: CPT

## 2017-10-17 PROCEDURE — 80053 COMPREHEN METABOLIC PANEL: CPT

## 2017-10-17 PROCEDURE — 87086 URINE CULTURE/COLONY COUNT: CPT

## 2017-10-17 PROCEDURE — 84155 ASSAY OF PROTEIN SERUM: CPT

## 2017-10-17 PROCEDURE — 82570 ASSAY OF URINE CREATININE: CPT

## 2017-10-26 ENCOUNTER — HOSPITAL ENCOUNTER (OUTPATIENT)
Dept: HOSPITAL 67 - LAB | Age: 77
Discharge: HOME | End: 2017-10-26
Attending: FAMILY MEDICINE
Payer: COMMERCIAL

## 2017-10-26 DIAGNOSIS — I50.22: ICD-10-CM

## 2017-10-26 DIAGNOSIS — I48.91: Primary | ICD-10-CM

## 2017-10-26 DIAGNOSIS — D64.89: ICD-10-CM

## 2017-10-26 LAB
PLATELET # BLD: 240 K/UL (ref 152–353)
POTASSIUM SERPL-SCNC: 4.3 MMOL/L (ref 3.6–5.2)
SODIUM SERPL-SCNC: 137 MMOL/L (ref 136–145)

## 2017-10-26 PROCEDURE — 80048 BASIC METABOLIC PNL TOTAL CA: CPT

## 2017-10-26 PROCEDURE — 85027 COMPLETE CBC AUTOMATED: CPT

## 2017-11-08 ENCOUNTER — HOSPITAL ENCOUNTER (INPATIENT)
Dept: HOSPITAL 67 - MED/SURG | Age: 77
LOS: 12 days | Discharge: NURSING, REHAB OR LONG-TERM CARE | DRG: 602 | End: 2017-11-20
Attending: FAMILY MEDICINE | Admitting: FAMILY MEDICINE
Payer: COMMERCIAL

## 2017-11-08 VITALS
WEIGHT: 287.37 LBS | BODY MASS INDEX: 54.26 KG/M2 | WEIGHT: 287.37 LBS | BODY MASS INDEX: 54.26 KG/M2 | HEIGHT: 61 IN | HEIGHT: 61 IN

## 2017-11-08 VITALS — TEMPERATURE: 98.8 F | SYSTOLIC BLOOD PRESSURE: 198 MMHG | DIASTOLIC BLOOD PRESSURE: 79 MMHG

## 2017-11-08 DIAGNOSIS — Z79.01: ICD-10-CM

## 2017-11-08 DIAGNOSIS — N18.5: ICD-10-CM

## 2017-11-08 DIAGNOSIS — I48.91: ICD-10-CM

## 2017-11-08 DIAGNOSIS — R62.7: ICD-10-CM

## 2017-11-08 DIAGNOSIS — R55: ICD-10-CM

## 2017-11-08 DIAGNOSIS — E66.01: ICD-10-CM

## 2017-11-08 DIAGNOSIS — I13.2: ICD-10-CM

## 2017-11-08 DIAGNOSIS — Z71.3: ICD-10-CM

## 2017-11-08 DIAGNOSIS — G25.81: ICD-10-CM

## 2017-11-08 DIAGNOSIS — E87.6: ICD-10-CM

## 2017-11-08 DIAGNOSIS — E87.4: ICD-10-CM

## 2017-11-08 DIAGNOSIS — I50.31: ICD-10-CM

## 2017-11-08 DIAGNOSIS — E03.8: ICD-10-CM

## 2017-11-08 DIAGNOSIS — R41.82: ICD-10-CM

## 2017-11-08 DIAGNOSIS — K21.9: ICD-10-CM

## 2017-11-08 DIAGNOSIS — B95.7: ICD-10-CM

## 2017-11-08 DIAGNOSIS — E83.41: ICD-10-CM

## 2017-11-08 DIAGNOSIS — J44.1: ICD-10-CM

## 2017-11-08 DIAGNOSIS — E11.22: ICD-10-CM

## 2017-11-08 DIAGNOSIS — J96.22: ICD-10-CM

## 2017-11-08 DIAGNOSIS — E11.42: ICD-10-CM

## 2017-11-08 DIAGNOSIS — D53.9: ICD-10-CM

## 2017-11-08 DIAGNOSIS — B96.5: ICD-10-CM

## 2017-11-08 DIAGNOSIS — L03.115: Primary | ICD-10-CM

## 2017-11-08 DIAGNOSIS — I89.0: ICD-10-CM

## 2017-11-08 LAB
PLATELET # BLD: 180 K/UL (ref 152–353)
POTASSIUM SERPL-SCNC: 4.1 MMOL/L (ref 3.6–5.2)
SODIUM SERPL-SCNC: 135 MMOL/L (ref 136–145)

## 2017-11-08 PROCEDURE — 51702 INSERT TEMP BLADDER CATH: CPT

## 2017-11-08 PROCEDURE — 86901 BLOOD TYPING SEROLOGIC RH(D): CPT

## 2017-11-08 PROCEDURE — 94760 N-INVAS EAR/PLS OXIMETRY 1: CPT

## 2017-11-08 PROCEDURE — 86922 COMPATIBILITY TEST ANTIGLOB: CPT

## 2017-11-08 PROCEDURE — 87070 CULTURE OTHR SPECIMN AEROBIC: CPT

## 2017-11-08 PROCEDURE — 83550 IRON BINDING TEST: CPT

## 2017-11-08 PROCEDURE — 93306 TTE W/DOPPLER COMPLETE: CPT

## 2017-11-08 PROCEDURE — 86900 BLOOD TYPING SEROLOGIC ABO: CPT

## 2017-11-08 PROCEDURE — 87205 SMEAR GRAM STAIN: CPT

## 2017-11-08 PROCEDURE — 84300 ASSAY OF URINE SODIUM: CPT

## 2017-11-08 PROCEDURE — P9016 RBC LEUKOCYTES REDUCED: HCPCS

## 2017-11-08 PROCEDURE — 85027 COMPLETE CBC AUTOMATED: CPT

## 2017-11-08 PROCEDURE — 84466 ASSAY OF TRANSFERRIN: CPT

## 2017-11-08 PROCEDURE — 83880 ASSAY OF NATRIURETIC PEPTIDE: CPT

## 2017-11-08 PROCEDURE — 82570 ASSAY OF URINE CREATININE: CPT

## 2017-11-08 PROCEDURE — 80053 COMPREHEN METABOLIC PANEL: CPT

## 2017-11-08 PROCEDURE — 83735 ASSAY OF MAGNESIUM: CPT

## 2017-11-08 PROCEDURE — 36600 WITHDRAWAL OF ARTERIAL BLOOD: CPT

## 2017-11-08 PROCEDURE — C1768 GRAFT, VASCULAR: HCPCS

## 2017-11-08 PROCEDURE — 82948 REAGENT STRIP/BLOOD GLUCOSE: CPT

## 2017-11-08 PROCEDURE — 85730 THROMBOPLASTIN TIME PARTIAL: CPT

## 2017-11-08 PROCEDURE — 36415 COLL VENOUS BLD VENIPUNCTURE: CPT

## 2017-11-08 PROCEDURE — 96372 THER/PROPH/DIAG INJ SC/IM: CPT

## 2017-11-08 PROCEDURE — 80048 BASIC METABOLIC PNL TOTAL CA: CPT

## 2017-11-08 PROCEDURE — 36571 INSERT PICVAD CATH: CPT

## 2017-11-08 PROCEDURE — 81000 URINALYSIS NONAUTO W/SCOPE: CPT

## 2017-11-08 PROCEDURE — 82728 ASSAY OF FERRITIN: CPT

## 2017-11-08 PROCEDURE — C1751 CATH, INF, PER/CENT/MIDLINE: HCPCS

## 2017-11-08 PROCEDURE — 87185 SC STD ENZYME DETCJ PER NZM: CPT

## 2017-11-08 PROCEDURE — 85379 FIBRIN DEGRADATION QUANT: CPT

## 2017-11-08 PROCEDURE — 93005 ELECTROCARDIOGRAM TRACING: CPT

## 2017-11-08 PROCEDURE — 82962 GLUCOSE BLOOD TEST: CPT

## 2017-11-08 PROCEDURE — 87077 CULTURE AEROBIC IDENTIFY: CPT

## 2017-11-08 PROCEDURE — 83540 ASSAY OF IRON: CPT

## 2017-11-08 PROCEDURE — 82550 ASSAY OF CK (CPK): CPT

## 2017-11-08 PROCEDURE — 87040 BLOOD CULTURE FOR BACTERIA: CPT

## 2017-11-08 PROCEDURE — 82805 BLOOD GASES W/O2 SATURATION: CPT

## 2017-11-08 PROCEDURE — 84100 ASSAY OF PHOSPHORUS: CPT

## 2017-11-08 PROCEDURE — 84484 ASSAY OF TROPONIN QUANT: CPT

## 2017-11-08 PROCEDURE — 94668 MNPJ CHEST WALL SBSQ: CPT

## 2017-11-08 PROCEDURE — 87088 URINE BACTERIA CULTURE: CPT

## 2017-11-08 PROCEDURE — 36591 DRAW BLOOD OFF VENOUS DEVICE: CPT

## 2017-11-08 PROCEDURE — 86850 RBC ANTIBODY SCREEN: CPT

## 2017-11-08 PROCEDURE — 85610 PROTHROMBIN TIME: CPT

## 2017-11-08 PROCEDURE — 87186 SC STD MICRODIL/AGAR DIL: CPT

## 2017-11-09 VITALS — DIASTOLIC BLOOD PRESSURE: 85 MMHG | SYSTOLIC BLOOD PRESSURE: 192 MMHG | TEMPERATURE: 98.3 F

## 2017-11-09 VITALS — SYSTOLIC BLOOD PRESSURE: 196 MMHG | TEMPERATURE: 98.3 F | DIASTOLIC BLOOD PRESSURE: 58 MMHG

## 2017-11-09 VITALS — DIASTOLIC BLOOD PRESSURE: 72 MMHG | SYSTOLIC BLOOD PRESSURE: 168 MMHG | TEMPERATURE: 98.1 F

## 2017-11-09 VITALS — TEMPERATURE: 98 F | DIASTOLIC BLOOD PRESSURE: 78 MMHG | SYSTOLIC BLOOD PRESSURE: 180 MMHG

## 2017-11-09 VITALS — SYSTOLIC BLOOD PRESSURE: 192 MMHG | TEMPERATURE: 98.7 F | DIASTOLIC BLOOD PRESSURE: 76 MMHG

## 2017-11-09 VITALS — SYSTOLIC BLOOD PRESSURE: 188 MMHG | DIASTOLIC BLOOD PRESSURE: 69 MMHG | TEMPERATURE: 98.9 F

## 2017-11-09 NOTE — NUR
11/8/17 AT 2245COLLECTED WOUND CULTURE FROM INNER, RIGHT LOWER LEG(UPPER
CALF AREA).  NOTE VERY SCANT AMOUNT OF CLEAR DRAINAGE
WEEPING FROM LEG.

## 2017-11-10 VITALS — TEMPERATURE: 97.4 F | DIASTOLIC BLOOD PRESSURE: 72 MMHG | SYSTOLIC BLOOD PRESSURE: 140 MMHG

## 2017-11-10 VITALS — DIASTOLIC BLOOD PRESSURE: 82 MMHG | TEMPERATURE: 98.2 F | SYSTOLIC BLOOD PRESSURE: 194 MMHG

## 2017-11-10 VITALS — DIASTOLIC BLOOD PRESSURE: 67 MMHG | SYSTOLIC BLOOD PRESSURE: 183 MMHG | TEMPERATURE: 97.9 F

## 2017-11-10 VITALS — SYSTOLIC BLOOD PRESSURE: 190 MMHG | DIASTOLIC BLOOD PRESSURE: 81 MMHG | TEMPERATURE: 97 F

## 2017-11-10 VITALS — DIASTOLIC BLOOD PRESSURE: 72 MMHG | TEMPERATURE: 98 F | SYSTOLIC BLOOD PRESSURE: 183 MMHG

## 2017-11-10 VITALS — SYSTOLIC BLOOD PRESSURE: 196 MMHG | DIASTOLIC BLOOD PRESSURE: 82 MMHG | TEMPERATURE: 98.4 F

## 2017-11-10 LAB
PLATELET # BLD: 170 K/UL (ref 152–353)
POTASSIUM SERPL-SCNC: 4 MMOL/L (ref 3.6–5.2)
SODIUM SERPL-SCNC: 139 MMOL/L (ref 136–145)

## 2017-11-10 NOTE — NUR
11/10/17 0610 DR. BEARD NOTIFIED OF PT HAVING POSTITIVE BLOOD CULTURE.ALSO
NOTIFIED OF LAB PT 17.1,INR 1.62 NO NEW ORDERS RECEIVED.CC

## 2017-11-10 NOTE — NUR
11/10/17 5161 DR.JOHNSON CALLED TO ASK IF DR. MOISE HAS SEEN PT LEG IT IS
DRAINING STATED HE NEEDS TO BE NOTIFED AGAIN IF NOT ALREADY SEEN PATIENT I
TOLD HER THAT I WILL MAKE SURE THEY GET THE MESSAGE TO FOLLOW UP WITH DR. MOISE ABOUT SEEING THE PATIENT.CC

## 2017-11-11 VITALS — DIASTOLIC BLOOD PRESSURE: 78 MMHG | TEMPERATURE: 97.6 F | SYSTOLIC BLOOD PRESSURE: 170 MMHG

## 2017-11-11 VITALS — TEMPERATURE: 97.5 F | DIASTOLIC BLOOD PRESSURE: 53 MMHG | SYSTOLIC BLOOD PRESSURE: 183 MMHG

## 2017-11-11 VITALS — SYSTOLIC BLOOD PRESSURE: 184 MMHG | TEMPERATURE: 98.6 F | DIASTOLIC BLOOD PRESSURE: 72 MMHG

## 2017-11-11 VITALS — TEMPERATURE: 97.5 F | SYSTOLIC BLOOD PRESSURE: 166 MMHG | DIASTOLIC BLOOD PRESSURE: 67 MMHG

## 2017-11-11 VITALS — DIASTOLIC BLOOD PRESSURE: 76 MMHG | TEMPERATURE: 98.6 F | SYSTOLIC BLOOD PRESSURE: 150 MMHG

## 2017-11-11 VITALS — SYSTOLIC BLOOD PRESSURE: 179 MMHG | DIASTOLIC BLOOD PRESSURE: 74 MMHG | TEMPERATURE: 98 F

## 2017-11-11 LAB
PLATELET # BLD: 181 K/UL (ref 152–353)
POTASSIUM SERPL-SCNC: 4 MMOL/L (ref 3.6–5.2)
SODIUM SERPL-SCNC: 139 MMOL/L (ref 136–145)

## 2017-11-12 VITALS — TEMPERATURE: 93.1 F | SYSTOLIC BLOOD PRESSURE: 171 MMHG | DIASTOLIC BLOOD PRESSURE: 84 MMHG

## 2017-11-12 VITALS — DIASTOLIC BLOOD PRESSURE: 57 MMHG | TEMPERATURE: 93.2 F | SYSTOLIC BLOOD PRESSURE: 130 MMHG

## 2017-11-12 VITALS — TEMPERATURE: 94.6 F | SYSTOLIC BLOOD PRESSURE: 154 MMHG | DIASTOLIC BLOOD PRESSURE: 70 MMHG

## 2017-11-12 VITALS — SYSTOLIC BLOOD PRESSURE: 130 MMHG | DIASTOLIC BLOOD PRESSURE: 48 MMHG

## 2017-11-12 VITALS — SYSTOLIC BLOOD PRESSURE: 126 MMHG | DIASTOLIC BLOOD PRESSURE: 75 MMHG

## 2017-11-12 VITALS — SYSTOLIC BLOOD PRESSURE: 138 MMHG | DIASTOLIC BLOOD PRESSURE: 58 MMHG

## 2017-11-12 VITALS — SYSTOLIC BLOOD PRESSURE: 195 MMHG | DIASTOLIC BLOOD PRESSURE: 58 MMHG

## 2017-11-12 VITALS — DIASTOLIC BLOOD PRESSURE: 62 MMHG | SYSTOLIC BLOOD PRESSURE: 147 MMHG

## 2017-11-12 VITALS — DIASTOLIC BLOOD PRESSURE: 60 MMHG | TEMPERATURE: 95.2 F | SYSTOLIC BLOOD PRESSURE: 140 MMHG

## 2017-11-12 VITALS — TEMPERATURE: 99 F

## 2017-11-12 VITALS — TEMPERATURE: 95 F

## 2017-11-12 VITALS — SYSTOLIC BLOOD PRESSURE: 142 MMHG | DIASTOLIC BLOOD PRESSURE: 56 MMHG | TEMPERATURE: 98.8 F

## 2017-11-12 VITALS — SYSTOLIC BLOOD PRESSURE: 156 MMHG | DIASTOLIC BLOOD PRESSURE: 70 MMHG

## 2017-11-12 VITALS — DIASTOLIC BLOOD PRESSURE: 63 MMHG | SYSTOLIC BLOOD PRESSURE: 135 MMHG

## 2017-11-12 VITALS — TEMPERATURE: 98.4 F

## 2017-11-12 VITALS — SYSTOLIC BLOOD PRESSURE: 161 MMHG | DIASTOLIC BLOOD PRESSURE: 87 MMHG

## 2017-11-12 VITALS — SYSTOLIC BLOOD PRESSURE: 158 MMHG | DIASTOLIC BLOOD PRESSURE: 76 MMHG

## 2017-11-12 VITALS — DIASTOLIC BLOOD PRESSURE: 73 MMHG | SYSTOLIC BLOOD PRESSURE: 149 MMHG

## 2017-11-12 VITALS — SYSTOLIC BLOOD PRESSURE: 175 MMHG | DIASTOLIC BLOOD PRESSURE: 57 MMHG | TEMPERATURE: 98.6 F

## 2017-11-12 VITALS — SYSTOLIC BLOOD PRESSURE: 121 MMHG | DIASTOLIC BLOOD PRESSURE: 56 MMHG

## 2017-11-12 VITALS — DIASTOLIC BLOOD PRESSURE: 60 MMHG | SYSTOLIC BLOOD PRESSURE: 155 MMHG | TEMPERATURE: 97.4 F

## 2017-11-12 VITALS — SYSTOLIC BLOOD PRESSURE: 137 MMHG | DIASTOLIC BLOOD PRESSURE: 73 MMHG

## 2017-11-12 VITALS — DIASTOLIC BLOOD PRESSURE: 72 MMHG | SYSTOLIC BLOOD PRESSURE: 140 MMHG

## 2017-11-12 VITALS — TEMPERATURE: 97.5 F | SYSTOLIC BLOOD PRESSURE: 116 MMHG | DIASTOLIC BLOOD PRESSURE: 87 MMHG

## 2017-11-12 VITALS — TEMPERATURE: 93.1 F | DIASTOLIC BLOOD PRESSURE: 70 MMHG | SYSTOLIC BLOOD PRESSURE: 156 MMHG

## 2017-11-12 VITALS — SYSTOLIC BLOOD PRESSURE: 177 MMHG | TEMPERATURE: 99.2 F | DIASTOLIC BLOOD PRESSURE: 73 MMHG

## 2017-11-12 VITALS — SYSTOLIC BLOOD PRESSURE: 133 MMHG | DIASTOLIC BLOOD PRESSURE: 57 MMHG

## 2017-11-12 VITALS — SYSTOLIC BLOOD PRESSURE: 155 MMHG | DIASTOLIC BLOOD PRESSURE: 60 MMHG

## 2017-11-12 VITALS — DIASTOLIC BLOOD PRESSURE: 99 MMHG | TEMPERATURE: 96.6 F | SYSTOLIC BLOOD PRESSURE: 146 MMHG

## 2017-11-12 VITALS — TEMPERATURE: 98.9 F

## 2017-11-12 LAB
APTT BLD: 33 SECONDS (ref 24.5–33.6)
PLATELET # BLD: 175 K/UL (ref 152–353)
POTASSIUM SERPL-SCNC: 4.6 MMOL/L (ref 3.6–5.2)
SODIUM SERPL-SCNC: 139 MMOL/L (ref 136–145)

## 2017-11-12 PROCEDURE — 5A09457 ASSISTANCE WITH RESPIRATORY VENTILATION, 24-96 CONSECUTIVE HOURS, CONTINUOUS POSITIVE AIRWAY PRESSURE: ICD-10-PCS | Performed by: FAMILY MEDICINE

## 2017-11-12 NOTE — NUR
ATTEMPTED IV INSERTION PER RAMON GLYNN RN , BLANKET WARMER APPLIERD WITH
WARMER SETTINGS AT 99 DEGREES. RECTAL TEMP PROBE INTACT. NOTED SMALL < .5CM
SKIN TEAR GLUTEAL FOLDS WHEN TURNED WITH SM AMT BRIGHT RED BLEEDING. PRESSURE
& STERILE FOLED 4X4 TO SITE. BLEEDING STOPPED. REPORTED TO DR HERNANDEZ. NEW
ORDERS. BATH WITH THERAWORX.

## 2017-11-12 NOTE — NUR
PT ARRIVED TO ICU BED 3. PT IS UNCONSCIOUS AT THIS TIME AND DOES NOT RESPOND
TO VERBAL STIMULIE.
PT WAS ATTACHED TO
MONITORS. VS ARE AS FOLLOWS - PULSE OX - 96%, HR - 70, BP - 159/77. PT IS ON
O2 4LPM VIA NC. PT IS USING ACCESSORY MUSCLES FOR BREATHING. PUPILS ARE
CONSTIRICTED AND NON RESPONSIVE. STERNUM RUB WAS USED TO TRY TO ATTEMPT TO GET
PATIENT TO RESOND. NO RESPONSE FROM PATIENT. BREATH SOUNDS ARE DIMINISED
THROUGHOUT BILATERALLY. S1 AND S2 HEART SOUNDS WERE NOTED UPON AUSCULTATION.
BOWEL SOUNDS ARE PRESENT IN ALL FOUR QUADRANTS. THERE IS A 22G IV NOTED TO THE
RIGHT HAND THAT IS SALINE LOCKED. SKIN TO THE CRUZITO LE'S ARE ROUGH AND RAISED
AND EDEMATOUS. PEDAL PULSES ARE WEAK BILATERALLY.

## 2017-11-12 NOTE — NUR
PT RESTING IN LF, FEET ELEVATED ON PILLOWS.,PULLED ARM AWAY & TRIED TO TALK
WHEN BLOOD WAS DRAWN.PERICARE.

## 2017-11-12 NOTE — NUR
PT ON L SIDE,HOB UP,PT MOVING HANDS AT INTERVALS. NO VERBALIZATIONS. FROWNS.
WHEN MOVEN. RECTAL TEMP 94.6.

## 2017-11-12 NOTE — NUR
REPORTED TO DR COLEMAN PT'S STATUS OF OUTPUT OF 200CC URINE. REPORTED PT'S TEMP
96.6 6 TEMP. REPORTED PT'S STATUS OF TRYING TO PULL OFF BIPAP MASK & PT'S
STATING 'LEAVE IT OFF A WHILE' WHEN MASK REMOVED BRIEFLY FOR MOUTH CARE.  NO
NEW ORDERS AT THIS TIME. PT BACK TO SLEEP ON BIPAP SAME SETTINGS.

## 2017-11-12 NOTE — NUR
DR COLEMAN CALLED, TALKED WITH RT LAYNE POSADA CRT R/T ABG RESULTS. TALKED
WITH RAMON ROMERO RN R/T PT'S STATUS. NEW ORDERS.

## 2017-11-12 NOTE — NUR
ABG REPORTED TO DR. HERNANDEZ AT 0810, CHANGED HER BIPAP SETTINGS YO 20/6 AND 40%
FIO2. WILL REPEAT ABG IN ONE HOUR.

## 2017-11-13 VITALS — SYSTOLIC BLOOD PRESSURE: 173 MMHG | DIASTOLIC BLOOD PRESSURE: 77 MMHG | TEMPERATURE: 100.1 F

## 2017-11-13 VITALS — TEMPERATURE: 99.6 F | SYSTOLIC BLOOD PRESSURE: 176 MMHG | DIASTOLIC BLOOD PRESSURE: 70 MMHG

## 2017-11-13 VITALS — TEMPERATURE: 99.5 F | SYSTOLIC BLOOD PRESSURE: 180 MMHG | DIASTOLIC BLOOD PRESSURE: 67 MMHG

## 2017-11-13 VITALS — DIASTOLIC BLOOD PRESSURE: 87 MMHG | SYSTOLIC BLOOD PRESSURE: 178 MMHG | TEMPERATURE: 99.3 F

## 2017-11-13 VITALS — DIASTOLIC BLOOD PRESSURE: 71 MMHG | SYSTOLIC BLOOD PRESSURE: 178 MMHG | TEMPERATURE: 99.9 F

## 2017-11-13 VITALS — TEMPERATURE: 99.4 F | SYSTOLIC BLOOD PRESSURE: 180 MMHG | DIASTOLIC BLOOD PRESSURE: 67 MMHG

## 2017-11-13 VITALS — TEMPERATURE: 99.4 F | SYSTOLIC BLOOD PRESSURE: 164 MMHG | DIASTOLIC BLOOD PRESSURE: 85 MMHG

## 2017-11-13 VITALS — DIASTOLIC BLOOD PRESSURE: 71 MMHG | TEMPERATURE: 100.3 F | SYSTOLIC BLOOD PRESSURE: 198 MMHG

## 2017-11-13 VITALS — DIASTOLIC BLOOD PRESSURE: 71 MMHG | SYSTOLIC BLOOD PRESSURE: 156 MMHG | TEMPERATURE: 99.1 F

## 2017-11-13 VITALS — SYSTOLIC BLOOD PRESSURE: 188 MMHG | DIASTOLIC BLOOD PRESSURE: 93 MMHG | TEMPERATURE: 99.9 F

## 2017-11-13 VITALS — SYSTOLIC BLOOD PRESSURE: 197 MMHG | TEMPERATURE: 100 F | DIASTOLIC BLOOD PRESSURE: 78 MMHG

## 2017-11-13 VITALS — SYSTOLIC BLOOD PRESSURE: 88 MMHG | DIASTOLIC BLOOD PRESSURE: 81 MMHG

## 2017-11-13 VITALS — TEMPERATURE: 100.6 F | SYSTOLIC BLOOD PRESSURE: 190 MMHG | DIASTOLIC BLOOD PRESSURE: 76 MMHG

## 2017-11-13 VITALS — DIASTOLIC BLOOD PRESSURE: 79 MMHG | TEMPERATURE: 99.9 F | SYSTOLIC BLOOD PRESSURE: 167 MMHG

## 2017-11-13 VITALS — TEMPERATURE: 100.1 F | DIASTOLIC BLOOD PRESSURE: 63 MMHG | SYSTOLIC BLOOD PRESSURE: 175 MMHG

## 2017-11-13 VITALS — SYSTOLIC BLOOD PRESSURE: 178 MMHG | DIASTOLIC BLOOD PRESSURE: 86 MMHG | TEMPERATURE: 100 F

## 2017-11-13 VITALS — DIASTOLIC BLOOD PRESSURE: 95 MMHG | SYSTOLIC BLOOD PRESSURE: 134 MMHG

## 2017-11-13 VITALS — DIASTOLIC BLOOD PRESSURE: 97 MMHG | SYSTOLIC BLOOD PRESSURE: 119 MMHG | TEMPERATURE: 100 F

## 2017-11-13 VITALS — TEMPERATURE: 100 F | SYSTOLIC BLOOD PRESSURE: 181 MMHG | DIASTOLIC BLOOD PRESSURE: 78 MMHG

## 2017-11-13 VITALS — DIASTOLIC BLOOD PRESSURE: 68 MMHG | TEMPERATURE: 99.2 F | SYSTOLIC BLOOD PRESSURE: 135 MMHG

## 2017-11-13 VITALS — DIASTOLIC BLOOD PRESSURE: 96 MMHG | SYSTOLIC BLOOD PRESSURE: 169 MMHG

## 2017-11-13 VITALS — TEMPERATURE: 99.6 F | SYSTOLIC BLOOD PRESSURE: 174 MMHG | DIASTOLIC BLOOD PRESSURE: 70 MMHG

## 2017-11-13 VITALS — SYSTOLIC BLOOD PRESSURE: 181 MMHG | DIASTOLIC BLOOD PRESSURE: 88 MMHG

## 2017-11-13 VITALS — DIASTOLIC BLOOD PRESSURE: 63 MMHG | SYSTOLIC BLOOD PRESSURE: 176 MMHG | TEMPERATURE: 99.5 F

## 2017-11-13 LAB
PLATELET # BLD: 166 K/UL (ref 152–353)
POTASSIUM SERPL-SCNC: 4.3 MMOL/L (ref 3.6–5.2)
SODIUM SERPL-SCNC: 143 MMOL/L (ref 136–145)

## 2017-11-13 PROCEDURE — 30233N1 TRANSFUSION OF NONAUTOLOGOUS RED BLOOD CELLS INTO PERIPHERAL VEIN, PERCUTANEOUS APPROACH: ICD-10-PCS | Performed by: FAMILY MEDICINE

## 2017-11-13 NOTE — NUR
PT'S LIPS/MOUTH WIPED CLEAN WITH WET CLOTH, MOUTH CARE COMPLETED AND PT
ALLOWED WRITER TO SWAB HER MOUTH WITH MOUTH WASH, LIP MOISTURIZER APPLIED.
WILL MONITOR CLOSELY.

## 2017-11-13 NOTE — NUR
SPOKE WITH PATIENTS DAUGHTER ABOUT PT CONDITION, INFOMRED OF NEW ORDERS.
DAUGHTER IS COMING TO FACILITY TO SIGN CONSENTS FOR BLOOD.
PT IS AROUSABLE BY PAIN AND NOXIOUS STIMULI
TURNED PT ON LEFT SIDE

## 2017-11-13 NOTE — NUR
LATE ENTRY: DURING ADMIT ON 11/8/2017 2200
PT WAS ASKED ABOUT ALLERGIES, PT STATES
SHE IS NOT ALLERGIC TO ZOCOR AND IT IS ONE OF HER HOME MEDICATIONS.

## 2017-11-13 NOTE — NUR
ATTEMPT TO ARROUSE PT SUCCESSFUL WHEN NOXIOUS STIMULI PERFORMED
ATTEMPT TO TAKE PT OFF OF BI PAP TO PERFORM ORAL CARE, PTS 02 DROPPED TO 71%
NOTIFIED JEREMIE JOAQUIN NP. PT BACK ON BIPAP AT THIS TIME AND 02 BACK AT 96%

## 2017-11-13 NOTE — NUR
PT DOES NOT OPEN EYES, DOES RESPOND TO NOXIOUS STIMULI, WHEN NURSING STAFF
REPOSITIONED PT'S LEGS AFTER BEING TURNED PT MUMBLED FOR STAFF TO LEAVE HER
LEGS ALONE.  PT GRABS PULSE OX TUBING AT TIMES AND HOLDS ON TO IT.  WILL
CONTINUE TO MONITOR CLOSELY.

## 2017-11-14 VITALS — SYSTOLIC BLOOD PRESSURE: 160 MMHG | DIASTOLIC BLOOD PRESSURE: 60 MMHG | TEMPERATURE: 100.4 F

## 2017-11-14 VITALS — SYSTOLIC BLOOD PRESSURE: 167 MMHG | TEMPERATURE: 99.8 F | DIASTOLIC BLOOD PRESSURE: 85 MMHG

## 2017-11-14 VITALS — TEMPERATURE: 100.1 F | DIASTOLIC BLOOD PRESSURE: 48 MMHG | SYSTOLIC BLOOD PRESSURE: 123 MMHG

## 2017-11-14 VITALS — TEMPERATURE: 98.5 F | SYSTOLIC BLOOD PRESSURE: 164 MMHG | DIASTOLIC BLOOD PRESSURE: 72 MMHG

## 2017-11-14 VITALS — SYSTOLIC BLOOD PRESSURE: 160 MMHG | TEMPERATURE: 100.1 F | DIASTOLIC BLOOD PRESSURE: 68 MMHG

## 2017-11-14 VITALS — TEMPERATURE: 98 F | SYSTOLIC BLOOD PRESSURE: 115 MMHG | DIASTOLIC BLOOD PRESSURE: 64 MMHG

## 2017-11-14 VITALS — DIASTOLIC BLOOD PRESSURE: 78 MMHG | SYSTOLIC BLOOD PRESSURE: 153 MMHG | TEMPERATURE: 100.3 F

## 2017-11-14 VITALS — TEMPERATURE: 100.2 F | SYSTOLIC BLOOD PRESSURE: 155 MMHG | DIASTOLIC BLOOD PRESSURE: 51 MMHG

## 2017-11-14 VITALS — SYSTOLIC BLOOD PRESSURE: 123 MMHG | TEMPERATURE: 100.4 F | DIASTOLIC BLOOD PRESSURE: 48 MMHG

## 2017-11-14 VITALS — SYSTOLIC BLOOD PRESSURE: 182 MMHG | TEMPERATURE: 99.8 F | DIASTOLIC BLOOD PRESSURE: 85 MMHG

## 2017-11-14 VITALS — DIASTOLIC BLOOD PRESSURE: 84 MMHG | SYSTOLIC BLOOD PRESSURE: 146 MMHG | TEMPERATURE: 99.7 F

## 2017-11-14 VITALS — SYSTOLIC BLOOD PRESSURE: 187 MMHG | DIASTOLIC BLOOD PRESSURE: 75 MMHG

## 2017-11-14 VITALS — TEMPERATURE: 98.8 F | SYSTOLIC BLOOD PRESSURE: 177 MMHG | DIASTOLIC BLOOD PRESSURE: 69 MMHG

## 2017-11-14 VITALS — SYSTOLIC BLOOD PRESSURE: 141 MMHG | DIASTOLIC BLOOD PRESSURE: 61 MMHG | TEMPERATURE: 98.6 F

## 2017-11-14 VITALS — DIASTOLIC BLOOD PRESSURE: 76 MMHG | SYSTOLIC BLOOD PRESSURE: 160 MMHG | TEMPERATURE: 98.6 F

## 2017-11-14 VITALS — TEMPERATURE: 98.6 F | SYSTOLIC BLOOD PRESSURE: 128 MMHG | DIASTOLIC BLOOD PRESSURE: 74 MMHG

## 2017-11-14 VITALS — SYSTOLIC BLOOD PRESSURE: 137 MMHG | DIASTOLIC BLOOD PRESSURE: 72 MMHG

## 2017-11-14 VITALS — TEMPERATURE: 100 F | SYSTOLIC BLOOD PRESSURE: 154 MMHG | DIASTOLIC BLOOD PRESSURE: 58 MMHG

## 2017-11-14 VITALS — SYSTOLIC BLOOD PRESSURE: 168 MMHG | DIASTOLIC BLOOD PRESSURE: 58 MMHG

## 2017-11-14 VITALS — DIASTOLIC BLOOD PRESSURE: 85 MMHG | SYSTOLIC BLOOD PRESSURE: 194 MMHG

## 2017-11-14 VITALS — SYSTOLIC BLOOD PRESSURE: 176 MMHG | DIASTOLIC BLOOD PRESSURE: 68 MMHG | TEMPERATURE: 100.1 F

## 2017-11-14 VITALS — DIASTOLIC BLOOD PRESSURE: 71 MMHG | SYSTOLIC BLOOD PRESSURE: 177 MMHG | TEMPERATURE: 100.4 F

## 2017-11-14 VITALS — TEMPERATURE: 100.7 F | SYSTOLIC BLOOD PRESSURE: 169 MMHG | DIASTOLIC BLOOD PRESSURE: 63 MMHG

## 2017-11-14 VITALS — DIASTOLIC BLOOD PRESSURE: 90 MMHG | SYSTOLIC BLOOD PRESSURE: 179 MMHG

## 2017-11-14 VITALS — DIASTOLIC BLOOD PRESSURE: 65 MMHG | SYSTOLIC BLOOD PRESSURE: 123 MMHG

## 2017-11-14 VITALS — TEMPERATURE: 100.1 F | DIASTOLIC BLOOD PRESSURE: 49 MMHG | SYSTOLIC BLOOD PRESSURE: 157 MMHG

## 2017-11-14 LAB
PLATELET # BLD: 161 K/UL (ref 152–353)
POTASSIUM SERPL-SCNC: 3.8 MMOL/L (ref 3.6–5.2)
SODIUM SERPL-SCNC: 146 MMOL/L (ref 136–145)

## 2017-11-14 PROCEDURE — B543ZZA ULTRASONOGRAPHY OF RIGHT JUGULAR VEINS, GUIDANCE: ICD-10-PCS | Performed by: SURGERY

## 2017-11-14 PROCEDURE — 05HM33Z INSERTION OF INFUSION DEVICE INTO RIGHT INTERNAL JUGULAR VEIN, PERCUTANEOUS APPROACH: ICD-10-PCS | Performed by: SURGERY

## 2017-11-14 NOTE — NUR
DR MOISE  BACK CHECKED X RAY OK TO USE LINES.  MOVED  LASIX DRIP  TO ONE PORT
 FLUSHED ALL OTHER LINES   NOTED   SMALL AMOUNT BLOOD AROUND CATH SITE
REPORTED TO  DR MOISE, STATED THAT  IT COULD BE EXPECTED. APPLIED  TAPE  OVER
DRESSING.   WILL   MONITOR.   PT'S   BED CHANGED   SPONGED OFF  CHANGED GOWN.
PT INC OF  LARGE STOOL.  JESUS CARE  RECTAL   TEMP PROBE OUT DIRTY  WILL LEAVE
OUT.  NOTED SOME  BLEEDING   WHEN  PT WIPED RECTUM.  SKIN  VERY THIN.  APPLIED
PROTECTIVE BARRIER  AFTER CLEANING  WILL KEEP PT OFF BACK.

## 2017-11-14 NOTE — NUR
DR MOISE HERE. SPOKE WITH PT AND DAUGHTER,   OK WITH GETTING A CENTRAL LINE
 FOR MEDS  AND BLOOD DRAWS.   DAUGHTER LISANDRA ESPINOZA SIGN CONSENT.  EXPLAINED
PROCEDURES TO  PATIENT,  PLACED  GOWN   AREA CLEANED  BY DR MOISE  ONE STICK
RIGHT SIDE OF NECK  GOOD  BLOOD RETURN   CATH INSERTED EASILY.  AREA  SECURED
CLEANED  APPLIED DRESSING.  ORDERED CHEST X RAY PT IRENA WELL.

## 2017-11-14 NOTE — NUR
PT BACK ON LEFT SIDE STATED THAT SHE NEEDED TO GO TO BR.  ASSSIT PT WITH
BEDPAN.   PASSED LARGE FORMED STOOL   SKIN CARE JESUS CARE DONE.   RESTING ON
LEFT SIDE  C/O  PAINS LEFT SHOULDER,BACK, AND HIPS   PT RECIEVED HYDROCODONE
1  TAB  PO FOR PAIN.

## 2017-11-14 NOTE — NUR
TURNED AND REPOSITIONED LASIX GTT CONTINUES MONITOR SR ARMS ELEVATED ON
PILLOWS.CONT TO WATCH TV.TEMP 98.8 A

## 2017-11-14 NOTE — NUR
RESTING  QUIETLY  EYES CLOSED    AX TEMP 98.5.  CONTINUE TO MONITOR  RIGHT
SIDE OF NECK  CENTRAL LINE   BLOOD UNDER   TAPE .  SLOWED DOWN.   DR MOISE
AWARE.

## 2017-11-14 NOTE — NUR
RCD PT AWAKE WATCHING TV CENTRAL DRESSING CHANGED RCW MONITOR SR O2 AT 4L N/C
TAYLOR/HOURLEY CLEAR 150/HR R LEG DRAINAGE POSTERIOR CALF EDEMA SKIN DRY AND
PEELING. LEFT LEG EDEMA DRY ELEVATED ON PILLOW HEELS SUSPENDED. LUNGS
DIMINISED WITH ABDOMINAL BREATHING. DENIES PAIN OR SOB AT THIS TIME. UPPER
ARMS BRUISING AND EDEMA. EXCORIATED AREA IN GROIN/UNDER BREAST WASHED NYSTOP
APPLIED JESUS CARE GIVEN  IRENA WELL

## 2017-11-14 NOTE — NUR
PT RECEIVING 2ND UNIT OF PRBC'S. PT RESTING WITH EYES CLOSED AND WEARING
BIPAP. PT WAS GIVEN ORAL CARE. LIPS LUBRICATED WITH PETROLEUM GEL. CM WITH SR.
HEELS FLOATING.

## 2017-11-14 NOTE — NUR
PT  RECIEVED   1 AMP  D50 AS ORDERED.   PT TURNED TO RIGHT  SIDE   CHECKED
SKIN,   SKIN  TRYING TO SLOUGH OFF  BUTTOCKS  NOTED    A LITTLE BLEEDING.
JESUS CARE DONE    PT ALERT TALKING THROUGH  BIPAP.   LASIX DRIP CONTINUES AT
20 MG HR. CALLED TO  SURGERY      ATTEMPT TO FIND OUT WHEN DR MOISE CAN
PLACE TRIPLE LUMEN LINE.  PT  HAS ONLY ONE IV  LEFT   RIGHT  HAND   SWELLING
TO BOTH HANDS  HASN'T  CHANGED SINCE THIS AM.

## 2017-11-14 NOTE — NUR
SAT  93 - 94%    ON 4 L NC.  HOB UP TAKING SIPS OF WATER   MOVED UP IN BED  DR MOISE CALLED CHECK PT STATUS. REPORT THAT PT STILL NEEDS LINE  FOR IV ACCESS.

## 2017-11-14 NOTE — NUR
REPOSITIONED PT TO LEFT SIDE. PRBC INFUSION COMPLETED. PT TOLERATED WITHOUT
ANY ADVERSE REACTION NOTED. ORAL CARE GIVEN. PT DOES RESPOND TO STIMULATION.

## 2017-11-14 NOTE — NUR
DR JOHNSON CALLED  RECIEVED REPORT PT CONDITION  AND LAB  REPORTS.   RECIEVED
NEW ORDERS  PT RESTING     SINGING  SONG,  TEEMP 100.2  RECTAL  COOLING
BLANKET IN PLACE.   LASIX  DRIP CONTINUES AT 20 ML  HR.  PT  VOIDING WELL.

## 2017-11-14 NOTE — NUR
DR BEARD VISITED  CHECKED PT  RECIEVED  NEW ORDERS.  FAMILY MEMBERS AT
BEDSIDE  PT  UP IN BED EATING LUNCH   IRENA  WELL SAT 96%  O2 AT 4L NC.   EATING
WELL.   RECIEVED NEW ORDERS.

## 2017-11-14 NOTE — NUR
PT WAS REPOSITIONED TO HER LEFT SIDE. PT MORE ALERT AND GROANS WHEN MOVED.
LOWER EXT ARE ELEVATED ON PILLOW. ORAL CARE WAS GIVEN.

## 2017-11-14 NOTE — NUR
AM ASSESSEMENT DONE PT RESTING IN BED EYES CLOSED  TEMP  100.0 RECTAL PROBE IN
PLACE   TAYLOR PATENT  VOIDING LARGE AMOUNT   200 HR CLEAR  URINE  EDEMA  ARMS
HANDS  AND LOWER EXT.    DIMISHED BREATH SOUNDS THROUGH OUT LUNG VENTURA
WEARING BIPAP SAT 99%.  PT HUMMING   [employer] SONGS.

## 2017-11-14 NOTE — NUR
PT TRYING TO TALK THROUGH BIPAP,   EXPLAINED TO PT THAT   I  WILL REMOVED MASK
 NEED FOR HER TO BREATH DEEP.   BIPAP OFF   CHANGED TO NC HUMIDIFIED  AT 4 L
SAT  96%  HOB UP  MONITOR CLOSELY

## 2017-11-15 VITALS — SYSTOLIC BLOOD PRESSURE: 182 MMHG | DIASTOLIC BLOOD PRESSURE: 60 MMHG

## 2017-11-15 VITALS — SYSTOLIC BLOOD PRESSURE: 185 MMHG | DIASTOLIC BLOOD PRESSURE: 69 MMHG

## 2017-11-15 VITALS — DIASTOLIC BLOOD PRESSURE: 71 MMHG | TEMPERATURE: 99.2 F | SYSTOLIC BLOOD PRESSURE: 185 MMHG

## 2017-11-15 VITALS — SYSTOLIC BLOOD PRESSURE: 188 MMHG | DIASTOLIC BLOOD PRESSURE: 61 MMHG

## 2017-11-15 VITALS — DIASTOLIC BLOOD PRESSURE: 60 MMHG | SYSTOLIC BLOOD PRESSURE: 118 MMHG | TEMPERATURE: 98 F

## 2017-11-15 VITALS — DIASTOLIC BLOOD PRESSURE: 82 MMHG | SYSTOLIC BLOOD PRESSURE: 192 MMHG | TEMPERATURE: 98 F

## 2017-11-15 VITALS — DIASTOLIC BLOOD PRESSURE: 65 MMHG | TEMPERATURE: 100.1 F | SYSTOLIC BLOOD PRESSURE: 159 MMHG

## 2017-11-15 VITALS — DIASTOLIC BLOOD PRESSURE: 68 MMHG | SYSTOLIC BLOOD PRESSURE: 199 MMHG

## 2017-11-15 VITALS — SYSTOLIC BLOOD PRESSURE: 178 MMHG | DIASTOLIC BLOOD PRESSURE: 67 MMHG

## 2017-11-15 VITALS — SYSTOLIC BLOOD PRESSURE: 167 MMHG | DIASTOLIC BLOOD PRESSURE: 55 MMHG

## 2017-11-15 VITALS — DIASTOLIC BLOOD PRESSURE: 62 MMHG | SYSTOLIC BLOOD PRESSURE: 148 MMHG

## 2017-11-15 VITALS — SYSTOLIC BLOOD PRESSURE: 187 MMHG | DIASTOLIC BLOOD PRESSURE: 64 MMHG

## 2017-11-15 VITALS — SYSTOLIC BLOOD PRESSURE: 174 MMHG | DIASTOLIC BLOOD PRESSURE: 76 MMHG

## 2017-11-15 VITALS — DIASTOLIC BLOOD PRESSURE: 52 MMHG | SYSTOLIC BLOOD PRESSURE: 180 MMHG

## 2017-11-15 VITALS — SYSTOLIC BLOOD PRESSURE: 168 MMHG | DIASTOLIC BLOOD PRESSURE: 81 MMHG

## 2017-11-15 VITALS — DIASTOLIC BLOOD PRESSURE: 77 MMHG | SYSTOLIC BLOOD PRESSURE: 199 MMHG | TEMPERATURE: 100.2 F

## 2017-11-15 VITALS — SYSTOLIC BLOOD PRESSURE: 192 MMHG | DIASTOLIC BLOOD PRESSURE: 62 MMHG

## 2017-11-15 VITALS — SYSTOLIC BLOOD PRESSURE: 177 MMHG | DIASTOLIC BLOOD PRESSURE: 66 MMHG

## 2017-11-15 VITALS — DIASTOLIC BLOOD PRESSURE: 85 MMHG | SYSTOLIC BLOOD PRESSURE: 176 MMHG

## 2017-11-15 VITALS — DIASTOLIC BLOOD PRESSURE: 58 MMHG | SYSTOLIC BLOOD PRESSURE: 176 MMHG

## 2017-11-15 VITALS — SYSTOLIC BLOOD PRESSURE: 119 MMHG | TEMPERATURE: 100.1 F | DIASTOLIC BLOOD PRESSURE: 53 MMHG

## 2017-11-15 VITALS — DIASTOLIC BLOOD PRESSURE: 139 MMHG | SYSTOLIC BLOOD PRESSURE: 147 MMHG | TEMPERATURE: 97.6 F

## 2017-11-15 LAB
PLATELET # BLD: 160 K/UL (ref 152–353)
POTASSIUM SERPL-SCNC: 3.6 MMOL/L (ref 3.6–5.2)
SODIUM SERPL-SCNC: 142 MMOL/L (ref 136–145)

## 2017-11-15 NOTE — NUR
RT GIVEN O2 AT 4L  AWAKE C/O PAIN SHOULER HYDROCODONE GIVEN DRANK 120CC SPRITE
REPOSITIONED LASIX CONT AT 5CC/5 MG OUT 100CC CLEAR URINE

## 2017-11-15 NOTE — NUR
REPORT FROM PM  STAFF. PT RESTING WITH EYES CLOSED, DOZING AT INTERVALS, AWAKE
& SINGING AT INTERVALS. ORIENTED TO SELF & PLACE.

## 2017-11-15 NOTE — NUR
ORDERD PO MEDS GIVED TO PT,PT VERY SUSPICIIOUS ABOUT MEDS & HAD TO LOOK AT
THEM & ASKED WHAT THEY EACH WERE. C/O PAIN IN L ARM & HAND AGAIN REFUSED PAIN
MED AGAIN. ELEVATED ON A PILLOW,POSITIONED FOR COMFORT. PT STATES 'THATS
BETTER'.

## 2017-11-15 NOTE — NUR
PT CONTINUES TO C/O PAIN IN L HAND & ARM, REFUSES MEDICATION FOR PAIN,PT
STATES'ITS JUST WHERE THEY STUCK ME SO MUCH IN THAT ARM',I'D RATHER DEAL WITH
IT'.

## 2017-11-16 VITALS — SYSTOLIC BLOOD PRESSURE: 183 MMHG | DIASTOLIC BLOOD PRESSURE: 74 MMHG

## 2017-11-16 VITALS — DIASTOLIC BLOOD PRESSURE: 64 MMHG | SYSTOLIC BLOOD PRESSURE: 188 MMHG | TEMPERATURE: 99.4 F

## 2017-11-16 VITALS — DIASTOLIC BLOOD PRESSURE: 62 MMHG | SYSTOLIC BLOOD PRESSURE: 182 MMHG

## 2017-11-16 VITALS — TEMPERATURE: 101 F

## 2017-11-16 VITALS — SYSTOLIC BLOOD PRESSURE: 179 MMHG | DIASTOLIC BLOOD PRESSURE: 65 MMHG | TEMPERATURE: 99.8 F

## 2017-11-16 VITALS — DIASTOLIC BLOOD PRESSURE: 80 MMHG | SYSTOLIC BLOOD PRESSURE: 217 MMHG

## 2017-11-16 VITALS — DIASTOLIC BLOOD PRESSURE: 60 MMHG | SYSTOLIC BLOOD PRESSURE: 190 MMHG

## 2017-11-16 VITALS — SYSTOLIC BLOOD PRESSURE: 189 MMHG | TEMPERATURE: 99.8 F | DIASTOLIC BLOOD PRESSURE: 61 MMHG

## 2017-11-16 VITALS — SYSTOLIC BLOOD PRESSURE: 193 MMHG | TEMPERATURE: 98.3 F | DIASTOLIC BLOOD PRESSURE: 71 MMHG

## 2017-11-16 VITALS — SYSTOLIC BLOOD PRESSURE: 184 MMHG | DIASTOLIC BLOOD PRESSURE: 73 MMHG

## 2017-11-16 VITALS — DIASTOLIC BLOOD PRESSURE: 70 MMHG | TEMPERATURE: 99.5 F | SYSTOLIC BLOOD PRESSURE: 195 MMHG

## 2017-11-16 VITALS — DIASTOLIC BLOOD PRESSURE: 59 MMHG | SYSTOLIC BLOOD PRESSURE: 172 MMHG | TEMPERATURE: 100.7 F

## 2017-11-16 VITALS — SYSTOLIC BLOOD PRESSURE: 183 MMHG | DIASTOLIC BLOOD PRESSURE: 68 MMHG

## 2017-11-16 VITALS — SYSTOLIC BLOOD PRESSURE: 177 MMHG | DIASTOLIC BLOOD PRESSURE: 59 MMHG

## 2017-11-16 VITALS — TEMPERATURE: 101.2 F

## 2017-11-16 VITALS — SYSTOLIC BLOOD PRESSURE: 194 MMHG | DIASTOLIC BLOOD PRESSURE: 65 MMHG

## 2017-11-16 VITALS — TEMPERATURE: 100 F

## 2017-11-16 LAB
PLATELET # BLD: 149 K/UL (ref 152–353)
POTASSIUM SERPL-SCNC: 3.1 MMOL/L (ref 3.6–5.2)
SODIUM SERPL-SCNC: 141 MMOL/L (ref 136–145)

## 2017-11-16 NOTE — NUR
11/15/2017 2010PT'S FAMILY BROUGHT HER SMALL BAG/PURSE, GLASSES, CELL
PHONE, AND CELL PHONE .  BELONGINGS PLACE IN BED
WITH PT PER HER REQUEST.

## 2017-11-16 NOTE — NUR
CALL PLACED TO PT'S DAUGHTER CAREY FLORES REGARDING CONTACTING ANNIA BEARD
ABOUT HOME O2 CONCENTRATOR. CAREY STATES THAT SHE WILL CONTACT HIM TODAY.

## 2017-11-16 NOTE — NUR
INFORMBED BY DARCY THAT PT IS GOING TO RM 1110 ON MED/SURG AND EVANGELINA NEREYDA
WILL BE NURSE AND WILL CALL FOR REPORT WHEN READY. PT AWARE THAT SHE IS BEING
TRANSFERRED OUT AND IS GLAD. PT STATES THAT SHE WANTS TO BE HOME BY
THANKSGIVING.

## 2017-11-16 NOTE — NUR
REPORT RECIEVED FROM MARIO RN. PT TRANSFERRED FROM ICU TO M/S FLOOR AT THIS TIME
VIA STRETCHER. ASSESSMENT COMPLETE AND NAD NOTED. PT HAS NO PROBLEMS OR
QUESTIONS AT THIS TIME. CENTRAL LINE INTACT. WILL CONT TO MONITOR PT.

## 2017-11-17 VITALS — TEMPERATURE: 99.5 F | SYSTOLIC BLOOD PRESSURE: 196 MMHG | DIASTOLIC BLOOD PRESSURE: 59 MMHG

## 2017-11-17 VITALS — TEMPERATURE: 98.3 F | DIASTOLIC BLOOD PRESSURE: 75 MMHG | SYSTOLIC BLOOD PRESSURE: 198 MMHG

## 2017-11-17 VITALS — DIASTOLIC BLOOD PRESSURE: 67 MMHG | TEMPERATURE: 98.5 F | SYSTOLIC BLOOD PRESSURE: 180 MMHG

## 2017-11-17 VITALS — TEMPERATURE: 99 F | DIASTOLIC BLOOD PRESSURE: 67 MMHG | SYSTOLIC BLOOD PRESSURE: 186 MMHG

## 2017-11-17 LAB
PLATELET # BLD: 161 K/UL (ref 152–353)
POTASSIUM SERPL-SCNC: 3.3 MMOL/L (ref 3.6–5.2)
SODIUM SERPL-SCNC: 143 MMOL/L (ref 136–145)

## 2017-11-17 NOTE — NUR
PHYSICAL THERAPY IN TO EVALUATE PATIENT THIS MORNING, PT ABLE TO SIT UP ON
SIDE OF BED, DID NOT STAND, NAD NOTED, NO C/O VOICED. PT BACK IN BED WITH HEAD
AND FEET ELEVATED

## 2017-11-18 VITALS — SYSTOLIC BLOOD PRESSURE: 188 MMHG | DIASTOLIC BLOOD PRESSURE: 74 MMHG | TEMPERATURE: 98.5 F

## 2017-11-18 VITALS — SYSTOLIC BLOOD PRESSURE: 150 MMHG | DIASTOLIC BLOOD PRESSURE: 59 MMHG | TEMPERATURE: 97.8 F

## 2017-11-18 VITALS — TEMPERATURE: 98.2 F | SYSTOLIC BLOOD PRESSURE: 157 MMHG | DIASTOLIC BLOOD PRESSURE: 46 MMHG

## 2017-11-18 VITALS — SYSTOLIC BLOOD PRESSURE: 175 MMHG | DIASTOLIC BLOOD PRESSURE: 54 MMHG | TEMPERATURE: 99.2 F

## 2017-11-18 VITALS — TEMPERATURE: 98.2 F | DIASTOLIC BLOOD PRESSURE: 53 MMHG | SYSTOLIC BLOOD PRESSURE: 143 MMHG

## 2017-11-18 VITALS — SYSTOLIC BLOOD PRESSURE: 184 MMHG | TEMPERATURE: 98.8 F | DIASTOLIC BLOOD PRESSURE: 64 MMHG

## 2017-11-18 LAB
PLATELET # BLD: 161 K/UL (ref 152–353)
POTASSIUM SERPL-SCNC: 4.2 MMOL/L (ref 3.6–5.2)
SODIUM SERPL-SCNC: 141 MMOL/L (ref 136–145)

## 2017-11-18 NOTE — NUR
SPOKE WITH DR BEARD ABOUT PT. INSTRUCTED TO START WEANING PT DOWN ON 02. 02
TURNED TO 2L AT THIS TIME PER NC. SAT STAYED 98-99%. WILL CHECK 02 FREQUENTLY.

## 2017-11-18 NOTE — NUR
CALLED PHARM IN REFERENCE TO MAGNESIUM DUE TO NO 100ML BAGS AVAILABLE. PER
PHARM OK TO MIX 2G MAG IN 50CC NS AND RUN OVER 2 HOURS.

## 2017-11-18 NOTE — NUR
11/18/17 0615 AROUSED UPON ENTRY TO ROOM PT RESTING WITH EYES CLOSED REFUSED
TO BE REPOSTIONED AT THIS NAD NOTED.CC

## 2017-11-19 VITALS — DIASTOLIC BLOOD PRESSURE: 68 MMHG | SYSTOLIC BLOOD PRESSURE: 215 MMHG | TEMPERATURE: 98.8 F

## 2017-11-19 VITALS — SYSTOLIC BLOOD PRESSURE: 206 MMHG | DIASTOLIC BLOOD PRESSURE: 65 MMHG | TEMPERATURE: 99.3 F

## 2017-11-19 VITALS — DIASTOLIC BLOOD PRESSURE: 64 MMHG | SYSTOLIC BLOOD PRESSURE: 170 MMHG | TEMPERATURE: 98.4 F

## 2017-11-19 VITALS — SYSTOLIC BLOOD PRESSURE: 165 MMHG | DIASTOLIC BLOOD PRESSURE: 50 MMHG | TEMPERATURE: 98.3 F

## 2017-11-19 VITALS — SYSTOLIC BLOOD PRESSURE: 184 MMHG | TEMPERATURE: 98.7 F | DIASTOLIC BLOOD PRESSURE: 68 MMHG

## 2017-11-19 VITALS — DIASTOLIC BLOOD PRESSURE: 75 MMHG | TEMPERATURE: 98.6 F | SYSTOLIC BLOOD PRESSURE: 189 MMHG

## 2017-11-19 LAB
PLATELET # BLD: 170 K/UL (ref 152–353)
POTASSIUM SERPL-SCNC: 4.7 MMOL/L (ref 3.6–5.2)
SODIUM SERPL-SCNC: 143 MMOL/L (ref 136–145)

## 2017-11-19 NOTE — NUR
PT UP TO BSC AT THIS TIME. BM AT THIS TIME. PT RECEIVED BATH AT THIS TIME.
TAYLOR CARE COMPLETED AS WELL. PT TOLERATED WELL. NEW LINENS AND GOWN PROVIDED.

## 2017-11-20 ENCOUNTER — HOSPITAL ENCOUNTER (INPATIENT)
Dept: HOSPITAL 67 - MED/SURG | Age: 77
LOS: 11 days | Discharge: HOME | DRG: 556 | End: 2017-12-01
Attending: FAMILY MEDICINE | Admitting: FAMILY MEDICINE
Payer: COMMERCIAL

## 2017-11-20 VITALS — SYSTOLIC BLOOD PRESSURE: 184 MMHG | TEMPERATURE: 98.2 F | DIASTOLIC BLOOD PRESSURE: 84 MMHG

## 2017-11-20 VITALS — SYSTOLIC BLOOD PRESSURE: 192 MMHG | DIASTOLIC BLOOD PRESSURE: 77 MMHG | TEMPERATURE: 98.5 F

## 2017-11-20 VITALS
HEIGHT: 61 IN | BODY MASS INDEX: 51.93 KG/M2 | WEIGHT: 275.06 LBS | BODY MASS INDEX: 51.93 KG/M2 | HEIGHT: 61 IN | WEIGHT: 275.06 LBS

## 2017-11-20 VITALS — DIASTOLIC BLOOD PRESSURE: 75 MMHG | TEMPERATURE: 97.9 F | SYSTOLIC BLOOD PRESSURE: 185 MMHG

## 2017-11-20 VITALS — SYSTOLIC BLOOD PRESSURE: 180 MMHG | TEMPERATURE: 98.4 F | DIASTOLIC BLOOD PRESSURE: 69 MMHG

## 2017-11-20 VITALS — TEMPERATURE: 98 F | SYSTOLIC BLOOD PRESSURE: 158 MMHG | DIASTOLIC BLOOD PRESSURE: 83 MMHG

## 2017-11-20 DIAGNOSIS — J44.9: ICD-10-CM

## 2017-11-20 DIAGNOSIS — L03.115: ICD-10-CM

## 2017-11-20 DIAGNOSIS — E11.22: ICD-10-CM

## 2017-11-20 DIAGNOSIS — D53.9: ICD-10-CM

## 2017-11-20 DIAGNOSIS — M62.81: Primary | ICD-10-CM

## 2017-11-20 DIAGNOSIS — R06.02: ICD-10-CM

## 2017-11-20 DIAGNOSIS — I12.9: ICD-10-CM

## 2017-11-20 DIAGNOSIS — N18.4: ICD-10-CM

## 2017-11-20 DIAGNOSIS — Z79.01: ICD-10-CM

## 2017-11-20 LAB
PLATELET # BLD: 178 K/UL (ref 152–353)
POTASSIUM SERPL-SCNC: 4.5 MMOL/L (ref 3.6–5.2)
SODIUM SERPL-SCNC: 142 MMOL/L (ref 136–145)

## 2017-11-20 PROCEDURE — 83735 ASSAY OF MAGNESIUM: CPT

## 2017-11-20 PROCEDURE — 94760 N-INVAS EAR/PLS OXIMETRY 1: CPT

## 2017-11-20 PROCEDURE — 80053 COMPREHEN METABOLIC PANEL: CPT

## 2017-11-20 PROCEDURE — 85027 COMPLETE CBC AUTOMATED: CPT

## 2017-11-20 PROCEDURE — 90658 IIV3 VACCINE SPLT 0.5 ML IM: CPT

## 2017-11-20 PROCEDURE — 36415 COLL VENOUS BLD VENIPUNCTURE: CPT

## 2017-11-20 PROCEDURE — 85610 PROTHROMBIN TIME: CPT

## 2017-11-20 PROCEDURE — 82948 REAGENT STRIP/BLOOD GLUCOSE: CPT

## 2017-11-21 VITALS — TEMPERATURE: 98.8 F | DIASTOLIC BLOOD PRESSURE: 86 MMHG | SYSTOLIC BLOOD PRESSURE: 145 MMHG

## 2017-11-21 LAB
PLATELET # BLD: 197 K/UL (ref 152–353)
POTASSIUM SERPL-SCNC: 4.7 MMOL/L (ref 3.6–5.2)
SODIUM SERPL-SCNC: 139 MMOL/L (ref 136–145)

## 2017-11-22 VITALS — SYSTOLIC BLOOD PRESSURE: 160 MMHG | DIASTOLIC BLOOD PRESSURE: 58 MMHG | TEMPERATURE: 98.4 F

## 2017-11-23 VITALS — SYSTOLIC BLOOD PRESSURE: 193 MMHG | TEMPERATURE: 98.8 F | DIASTOLIC BLOOD PRESSURE: 79 MMHG

## 2017-11-23 VITALS — TEMPERATURE: 98.5 F | DIASTOLIC BLOOD PRESSURE: 61 MMHG | SYSTOLIC BLOOD PRESSURE: 195 MMHG

## 2017-11-23 LAB
PLATELET # BLD: 205 K/UL (ref 152–353)
POTASSIUM SERPL-SCNC: 5.1 MMOL/L (ref 3.6–5.2)
SODIUM SERPL-SCNC: 140 MMOL/L (ref 136–145)

## 2017-11-24 VITALS — TEMPERATURE: 98.2 F | SYSTOLIC BLOOD PRESSURE: 150 MMHG | DIASTOLIC BLOOD PRESSURE: 78 MMHG

## 2017-11-25 VITALS — DIASTOLIC BLOOD PRESSURE: 65 MMHG | SYSTOLIC BLOOD PRESSURE: 194 MMHG | TEMPERATURE: 97.6 F

## 2017-11-25 VITALS — DIASTOLIC BLOOD PRESSURE: 60 MMHG | SYSTOLIC BLOOD PRESSURE: 173 MMHG | TEMPERATURE: 98.7 F

## 2017-11-26 VITALS — DIASTOLIC BLOOD PRESSURE: 65 MMHG | TEMPERATURE: 98.7 F | SYSTOLIC BLOOD PRESSURE: 192 MMHG

## 2017-11-26 VITALS — DIASTOLIC BLOOD PRESSURE: 67 MMHG | TEMPERATURE: 98.5 F | SYSTOLIC BLOOD PRESSURE: 174 MMHG

## 2017-11-27 LAB
PLATELET # BLD: 224 K/UL (ref 152–353)
POTASSIUM SERPL-SCNC: 5.2 MMOL/L (ref 3.6–5.2)
SODIUM SERPL-SCNC: 140 MMOL/L (ref 136–145)

## 2017-11-28 VITALS — DIASTOLIC BLOOD PRESSURE: 60 MMHG | TEMPERATURE: 98.5 F | SYSTOLIC BLOOD PRESSURE: 161 MMHG

## 2017-11-29 VITALS — DIASTOLIC BLOOD PRESSURE: 82 MMHG | SYSTOLIC BLOOD PRESSURE: 160 MMHG | TEMPERATURE: 98.2 F

## 2017-11-30 VITALS — TEMPERATURE: 98.3 F | SYSTOLIC BLOOD PRESSURE: 160 MMHG | DIASTOLIC BLOOD PRESSURE: 55 MMHG

## 2017-11-30 VITALS — TEMPERATURE: 98.8 F | DIASTOLIC BLOOD PRESSURE: 62 MMHG | SYSTOLIC BLOOD PRESSURE: 161 MMHG

## 2017-11-30 LAB
PLATELET # BLD: 210 K/UL (ref 152–353)
POTASSIUM SERPL-SCNC: 5.3 MMOL/L (ref 3.6–5.2)
SODIUM SERPL-SCNC: 143 MMOL/L (ref 136–145)

## 2017-12-01 VITALS — SYSTOLIC BLOOD PRESSURE: 179 MMHG | TEMPERATURE: 98.5 F | DIASTOLIC BLOOD PRESSURE: 63 MMHG

## 2017-12-04 ENCOUNTER — HOSPITAL ENCOUNTER (OUTPATIENT)
Dept: HOSPITAL 67 - LAB | Age: 77
Discharge: HOME | End: 2017-12-04
Attending: INTERNAL MEDICINE
Payer: COMMERCIAL

## 2017-12-04 DIAGNOSIS — D63.1: ICD-10-CM

## 2017-12-04 DIAGNOSIS — I48.0: ICD-10-CM

## 2017-12-04 DIAGNOSIS — N18.4: Primary | ICD-10-CM

## 2017-12-04 LAB
PLATELET # BLD: 191 K/UL (ref 152–353)
POTASSIUM SERPL-SCNC: 3.7 MMOL/L (ref 3.6–5.2)
SODIUM SERPL-SCNC: 135 MMOL/L (ref 136–145)

## 2017-12-04 PROCEDURE — 83550 IRON BINDING TEST: CPT

## 2017-12-04 PROCEDURE — 80053 COMPREHEN METABOLIC PANEL: CPT

## 2017-12-04 PROCEDURE — 82746 ASSAY OF FOLIC ACID SERUM: CPT

## 2017-12-04 PROCEDURE — 82728 ASSAY OF FERRITIN: CPT

## 2017-12-04 PROCEDURE — 85027 COMPLETE CBC AUTOMATED: CPT

## 2017-12-04 PROCEDURE — 83540 ASSAY OF IRON: CPT

## 2017-12-07 ENCOUNTER — HOSPITAL ENCOUNTER (OUTPATIENT)
Dept: HOSPITAL 67 - LAB | Age: 77
Discharge: HOME | End: 2017-12-07
Attending: FAMILY MEDICINE
Payer: COMMERCIAL

## 2017-12-07 DIAGNOSIS — I50.9: ICD-10-CM

## 2017-12-07 DIAGNOSIS — N18.2: Primary | ICD-10-CM

## 2017-12-07 LAB
PLATELET # BLD: 189 K/UL (ref 152–353)
POTASSIUM SERPL-SCNC: 3.9 MMOL/L (ref 3.6–5.2)
SODIUM SERPL-SCNC: 139 MMOL/L (ref 136–145)

## 2017-12-07 PROCEDURE — 80053 COMPREHEN METABOLIC PANEL: CPT

## 2017-12-07 PROCEDURE — 85027 COMPLETE CBC AUTOMATED: CPT

## 2017-12-11 ENCOUNTER — HOSPITAL ENCOUNTER (OUTPATIENT)
Dept: HOSPITAL 67 - LAB | Age: 77
Discharge: HOME | End: 2017-12-11
Attending: FAMILY MEDICINE
Payer: COMMERCIAL

## 2017-12-11 DIAGNOSIS — I12.9: Primary | ICD-10-CM

## 2017-12-11 DIAGNOSIS — N18.4: ICD-10-CM

## 2017-12-11 DIAGNOSIS — E87.5: ICD-10-CM

## 2017-12-11 LAB
PLATELET # BLD: 180 K/UL (ref 152–353)
POTASSIUM SERPL-SCNC: 4.2 MMOL/L (ref 3.6–5.2)
SODIUM SERPL-SCNC: 140 MMOL/L (ref 136–145)

## 2017-12-11 PROCEDURE — 80053 COMPREHEN METABOLIC PANEL: CPT

## 2017-12-11 PROCEDURE — 85027 COMPLETE CBC AUTOMATED: CPT

## 2017-12-14 ENCOUNTER — HOSPITAL ENCOUNTER (OUTPATIENT)
Dept: HOSPITAL 67 - LAB | Age: 77
Discharge: HOME | End: 2017-12-14
Attending: FAMILY MEDICINE
Payer: COMMERCIAL

## 2017-12-14 DIAGNOSIS — I12.9: Primary | ICD-10-CM

## 2017-12-14 DIAGNOSIS — N18.2: ICD-10-CM

## 2017-12-14 DIAGNOSIS — I48.91: ICD-10-CM

## 2017-12-14 LAB
PLATELET # BLD: 195 K/UL (ref 152–353)
POTASSIUM SERPL-SCNC: 4.6 MMOL/L (ref 3.6–5.2)
SODIUM SERPL-SCNC: 138 MMOL/L (ref 136–145)

## 2017-12-14 PROCEDURE — 80053 COMPREHEN METABOLIC PANEL: CPT

## 2017-12-14 PROCEDURE — 85027 COMPLETE CBC AUTOMATED: CPT

## 2017-12-18 ENCOUNTER — HOSPITAL ENCOUNTER (OUTPATIENT)
Dept: HOSPITAL 67 - LAB | Age: 77
Discharge: HOME | End: 2017-12-18
Attending: FAMILY MEDICINE
Payer: COMMERCIAL

## 2017-12-18 DIAGNOSIS — I12.9: ICD-10-CM

## 2017-12-18 DIAGNOSIS — N18.2: ICD-10-CM

## 2017-12-18 DIAGNOSIS — I50.9: ICD-10-CM

## 2017-12-18 DIAGNOSIS — D64.89: Primary | ICD-10-CM

## 2017-12-18 LAB
PLATELET # BLD: 210 K/UL (ref 152–353)
POTASSIUM SERPL-SCNC: 5 MMOL/L (ref 3.6–5.2)
SODIUM SERPL-SCNC: 135 MMOL/L (ref 136–145)

## 2017-12-18 PROCEDURE — 85027 COMPLETE CBC AUTOMATED: CPT

## 2017-12-18 PROCEDURE — 80053 COMPREHEN METABOLIC PANEL: CPT

## 2017-12-28 ENCOUNTER — HOSPITAL ENCOUNTER (OUTPATIENT)
Dept: HOSPITAL 67 - LAB | Age: 77
Discharge: HOME | End: 2017-12-28
Attending: FAMILY MEDICINE
Payer: COMMERCIAL

## 2017-12-28 DIAGNOSIS — N18.4: Primary | ICD-10-CM

## 2017-12-28 LAB
PLATELET # BLD: 214 K/UL (ref 152–353)
POTASSIUM SERPL-SCNC: 5.6 MMOL/L (ref 3.6–5.2)
SODIUM SERPL-SCNC: 136 MMOL/L (ref 136–145)

## 2017-12-28 PROCEDURE — 80053 COMPREHEN METABOLIC PANEL: CPT

## 2017-12-28 PROCEDURE — 85027 COMPLETE CBC AUTOMATED: CPT

## 2018-01-05 ENCOUNTER — HOSPITAL ENCOUNTER (OUTPATIENT)
Dept: HOSPITAL 67 - LAB | Age: 78
LOS: 1 days | Discharge: HOME | End: 2018-01-06
Attending: FAMILY MEDICINE
Payer: COMMERCIAL

## 2018-01-05 DIAGNOSIS — N18.4: Primary | ICD-10-CM

## 2018-01-05 LAB
PLATELET # BLD: 167 K/UL (ref 152–353)
POTASSIUM SERPL-SCNC: 4.9 MMOL/L (ref 3.6–5.2)
SODIUM SERPL-SCNC: 137 MMOL/L (ref 136–145)

## 2018-01-05 PROCEDURE — 80053 COMPREHEN METABOLIC PANEL: CPT

## 2018-01-05 PROCEDURE — 85027 COMPLETE CBC AUTOMATED: CPT

## 2018-01-10 ENCOUNTER — HOSPITAL ENCOUNTER (OUTPATIENT)
Dept: HOSPITAL 67 - LAB | Age: 78
Discharge: HOME | End: 2018-01-10
Attending: FAMILY MEDICINE
Payer: COMMERCIAL

## 2018-01-10 DIAGNOSIS — N18.2: ICD-10-CM

## 2018-01-10 DIAGNOSIS — I50.9: Primary | ICD-10-CM

## 2018-01-10 LAB
PLATELET # BLD: 172 K/UL (ref 152–353)
POTASSIUM SERPL-SCNC: 4.4 MMOL/L (ref 3.6–5.2)
SODIUM SERPL-SCNC: 136 MMOL/L (ref 136–145)

## 2018-01-10 PROCEDURE — 85027 COMPLETE CBC AUTOMATED: CPT

## 2018-01-10 PROCEDURE — 80053 COMPREHEN METABOLIC PANEL: CPT

## 2018-01-14 ENCOUNTER — HOSPITAL ENCOUNTER (OUTPATIENT)
Dept: HOSPITAL 67 - AMB | Age: 78
Discharge: ANOTHER HOSPITAL | End: 2018-01-14
Payer: COMMERCIAL

## 2018-01-14 ENCOUNTER — HOSPITAL ENCOUNTER (EMERGENCY)
Dept: HOSPITAL 67 - ED | Age: 78
Discharge: HOME | End: 2018-01-14
Payer: COMMERCIAL

## 2018-01-14 VITALS — DIASTOLIC BLOOD PRESSURE: 99 MMHG | TEMPERATURE: 99.1 F | SYSTOLIC BLOOD PRESSURE: 179 MMHG

## 2018-01-14 VITALS — WEIGHT: 269 LBS | BODY MASS INDEX: 47.66 KG/M2 | HEIGHT: 63 IN

## 2018-01-14 DIAGNOSIS — J18.9: Primary | ICD-10-CM

## 2018-01-14 DIAGNOSIS — R51: Primary | ICD-10-CM

## 2018-01-14 DIAGNOSIS — I82.409: ICD-10-CM

## 2018-01-14 DIAGNOSIS — L03.116: ICD-10-CM

## 2018-01-14 DIAGNOSIS — R25.8: ICD-10-CM

## 2018-01-14 DIAGNOSIS — G25.2: ICD-10-CM

## 2018-01-14 LAB
APTT BLD: 34.1 SECONDS (ref 24.5–33.6)
PLATELET # BLD: 210 K/UL (ref 152–353)
POTASSIUM SERPL-SCNC: 4.2 MMOL/L (ref 3.6–5.2)
SODIUM SERPL-SCNC: 132 MMOL/L (ref 136–145)

## 2018-01-14 PROCEDURE — 80048 BASIC METABOLIC PNL TOTAL CA: CPT

## 2018-01-14 PROCEDURE — 96372 THER/PROPH/DIAG INJ SC/IM: CPT

## 2018-01-14 PROCEDURE — 85730 THROMBOPLASTIN TIME PARTIAL: CPT

## 2018-01-14 PROCEDURE — 87040 BLOOD CULTURE FOR BACTERIA: CPT

## 2018-01-14 PROCEDURE — 87804 INFLUENZA ASSAY W/OPTIC: CPT

## 2018-01-14 PROCEDURE — A0425 GROUND MILEAGE: HCPCS

## 2018-01-14 PROCEDURE — 85027 COMPLETE CBC AUTOMATED: CPT

## 2018-01-14 PROCEDURE — A0427 ALS1-EMERGENCY: HCPCS

## 2018-01-14 PROCEDURE — 85610 PROTHROMBIN TIME: CPT

## 2018-01-14 PROCEDURE — 99283 EMERGENCY DEPT VISIT LOW MDM: CPT

## 2018-01-14 PROCEDURE — 36415 COLL VENOUS BLD VENIPUNCTURE: CPT

## 2018-01-17 ENCOUNTER — HOSPITAL ENCOUNTER (OUTPATIENT)
Dept: HOSPITAL 67 - LAB | Age: 78
Discharge: HOME | End: 2018-01-17
Attending: FAMILY MEDICINE
Payer: COMMERCIAL

## 2018-01-17 DIAGNOSIS — I50.22: Primary | ICD-10-CM

## 2018-01-17 DIAGNOSIS — N18.4: ICD-10-CM

## 2018-01-17 LAB
PLATELET # BLD: 150 K/UL (ref 152–353)
POTASSIUM SERPL-SCNC: 3.9 MMOL/L (ref 3.6–5.2)
SODIUM SERPL-SCNC: 134 MMOL/L (ref 136–145)

## 2018-01-17 PROCEDURE — 80053 COMPREHEN METABOLIC PANEL: CPT

## 2018-01-17 PROCEDURE — 85027 COMPLETE CBC AUTOMATED: CPT

## 2018-01-24 ENCOUNTER — HOSPITAL ENCOUNTER (OUTPATIENT)
Dept: HOSPITAL 67 - LAB | Age: 78
Discharge: HOME | End: 2018-01-24
Attending: FAMILY MEDICINE
Payer: COMMERCIAL

## 2018-01-24 DIAGNOSIS — I50.9: ICD-10-CM

## 2018-01-24 DIAGNOSIS — N18.4: Primary | ICD-10-CM

## 2018-01-24 DIAGNOSIS — I48.91: ICD-10-CM

## 2018-01-24 LAB
PLATELET # BLD: 327 K/UL (ref 152–353)
POTASSIUM SERPL-SCNC: 4.4 MMOL/L (ref 3.6–5.2)
SODIUM SERPL-SCNC: 132 MMOL/L (ref 136–145)

## 2018-01-24 PROCEDURE — 80053 COMPREHEN METABOLIC PANEL: CPT

## 2018-01-24 PROCEDURE — 85027 COMPLETE CBC AUTOMATED: CPT

## 2018-01-30 ENCOUNTER — HOSPITAL ENCOUNTER (OUTPATIENT)
Dept: HOSPITAL 67 - LAB | Age: 78
Discharge: HOME | End: 2018-01-30
Attending: FAMILY MEDICINE
Payer: COMMERCIAL

## 2018-01-30 DIAGNOSIS — I50.22: ICD-10-CM

## 2018-01-30 DIAGNOSIS — N18.4: Primary | ICD-10-CM

## 2018-01-30 LAB
PLATELET # BLD: 331 K/UL (ref 152–353)
POTASSIUM SERPL-SCNC: 4.4 MMOL/L (ref 3.6–5.2)
SODIUM SERPL-SCNC: 135 MMOL/L (ref 136–145)

## 2018-01-30 PROCEDURE — 80053 COMPREHEN METABOLIC PANEL: CPT

## 2018-01-30 PROCEDURE — 85027 COMPLETE CBC AUTOMATED: CPT

## 2018-02-07 ENCOUNTER — HOSPITAL ENCOUNTER (OUTPATIENT)
Dept: HOSPITAL 67 - LAB | Age: 78
Discharge: HOME | End: 2018-02-07
Attending: FAMILY MEDICINE
Payer: COMMERCIAL

## 2018-02-07 DIAGNOSIS — I50.9: Primary | ICD-10-CM

## 2018-02-07 DIAGNOSIS — N18.2: ICD-10-CM

## 2018-02-07 LAB
PLATELET # BLD: 202 K/UL (ref 152–353)
POTASSIUM SERPL-SCNC: 4.5 MMOL/L (ref 3.6–5.2)
SODIUM SERPL-SCNC: 136 MMOL/L (ref 136–145)

## 2018-02-07 PROCEDURE — 80053 COMPREHEN METABOLIC PANEL: CPT

## 2018-02-07 PROCEDURE — 85027 COMPLETE CBC AUTOMATED: CPT

## 2018-02-13 ENCOUNTER — HOSPITAL ENCOUNTER (INPATIENT)
Dept: HOSPITAL 67 - MED/SURG | Age: 78
LOS: 5 days | Discharge: HOME | DRG: 603 | End: 2018-02-18
Attending: FAMILY MEDICINE | Admitting: FAMILY MEDICINE
Payer: COMMERCIAL

## 2018-02-13 VITALS — SYSTOLIC BLOOD PRESSURE: 186 MMHG | TEMPERATURE: 98.9 F | DIASTOLIC BLOOD PRESSURE: 66 MMHG

## 2018-02-13 VITALS
WEIGHT: 273.19 LBS | BODY MASS INDEX: 48.41 KG/M2 | WEIGHT: 273.19 LBS | HEIGHT: 63 IN | HEIGHT: 63 IN | BODY MASS INDEX: 48.41 KG/M2

## 2018-02-13 VITALS — TEMPERATURE: 98.2 F | DIASTOLIC BLOOD PRESSURE: 74 MMHG | SYSTOLIC BLOOD PRESSURE: 188 MMHG

## 2018-02-13 DIAGNOSIS — I12.9: ICD-10-CM

## 2018-02-13 DIAGNOSIS — I89.0: ICD-10-CM

## 2018-02-13 DIAGNOSIS — E03.8: ICD-10-CM

## 2018-02-13 DIAGNOSIS — D64.89: ICD-10-CM

## 2018-02-13 DIAGNOSIS — B96.4: ICD-10-CM

## 2018-02-13 DIAGNOSIS — L03.116: Primary | ICD-10-CM

## 2018-02-13 DIAGNOSIS — E66.01: ICD-10-CM

## 2018-02-13 DIAGNOSIS — N18.4: ICD-10-CM

## 2018-02-13 DIAGNOSIS — Z79.01: ICD-10-CM

## 2018-02-13 DIAGNOSIS — E11.22: ICD-10-CM

## 2018-02-13 LAB
PLATELET # BLD: 192 K/UL (ref 152–353)
POTASSIUM SERPL-SCNC: 3.9 MMOL/L (ref 3.6–5.2)
SODIUM SERPL-SCNC: 137 MMOL/L (ref 136–145)

## 2018-02-13 PROCEDURE — 80053 COMPREHEN METABOLIC PANEL: CPT

## 2018-02-13 PROCEDURE — 87205 SMEAR GRAM STAIN: CPT

## 2018-02-13 PROCEDURE — 80202 ASSAY OF VANCOMYCIN: CPT

## 2018-02-13 PROCEDURE — 87186 SC STD MICRODIL/AGAR DIL: CPT

## 2018-02-13 PROCEDURE — 87070 CULTURE OTHR SPECIMN AEROBIC: CPT

## 2018-02-13 PROCEDURE — 96367 TX/PROPH/DG ADDL SEQ IV INF: CPT

## 2018-02-13 PROCEDURE — 87077 CULTURE AEROBIC IDENTIFY: CPT

## 2018-02-13 PROCEDURE — 82948 REAGENT STRIP/BLOOD GLUCOSE: CPT

## 2018-02-13 PROCEDURE — 87040 BLOOD CULTURE FOR BACTERIA: CPT

## 2018-02-13 PROCEDURE — 85027 COMPLETE CBC AUTOMATED: CPT

## 2018-02-13 PROCEDURE — 36415 COLL VENOUS BLD VENIPUNCTURE: CPT

## 2018-02-13 PROCEDURE — 85730 THROMBOPLASTIN TIME PARTIAL: CPT

## 2018-02-13 PROCEDURE — 85610 PROTHROMBIN TIME: CPT

## 2018-02-13 PROCEDURE — 84443 ASSAY THYROID STIM HORMONE: CPT

## 2018-02-14 VITALS — DIASTOLIC BLOOD PRESSURE: 69 MMHG | SYSTOLIC BLOOD PRESSURE: 191 MMHG | TEMPERATURE: 98.7 F

## 2018-02-14 VITALS — SYSTOLIC BLOOD PRESSURE: 177 MMHG | DIASTOLIC BLOOD PRESSURE: 63 MMHG | TEMPERATURE: 98.6 F

## 2018-02-14 VITALS — SYSTOLIC BLOOD PRESSURE: 195 MMHG | TEMPERATURE: 98.8 F | DIASTOLIC BLOOD PRESSURE: 74 MMHG

## 2018-02-14 VITALS — DIASTOLIC BLOOD PRESSURE: 57 MMHG | TEMPERATURE: 98.2 F | SYSTOLIC BLOOD PRESSURE: 179 MMHG

## 2018-02-14 VITALS — TEMPERATURE: 98.8 F | DIASTOLIC BLOOD PRESSURE: 47 MMHG | SYSTOLIC BLOOD PRESSURE: 165 MMHG

## 2018-02-14 VITALS — TEMPERATURE: 98.8 F | DIASTOLIC BLOOD PRESSURE: 54 MMHG | SYSTOLIC BLOOD PRESSURE: 150 MMHG

## 2018-02-15 VITALS — DIASTOLIC BLOOD PRESSURE: 64 MMHG | TEMPERATURE: 98.6 F | SYSTOLIC BLOOD PRESSURE: 154 MMHG

## 2018-02-15 VITALS — TEMPERATURE: 98.7 F | DIASTOLIC BLOOD PRESSURE: 83 MMHG | SYSTOLIC BLOOD PRESSURE: 170 MMHG

## 2018-02-15 VITALS — TEMPERATURE: 99.2 F | SYSTOLIC BLOOD PRESSURE: 132 MMHG | DIASTOLIC BLOOD PRESSURE: 54 MMHG

## 2018-02-15 VITALS — TEMPERATURE: 98.5 F | SYSTOLIC BLOOD PRESSURE: 198 MMHG | DIASTOLIC BLOOD PRESSURE: 80 MMHG

## 2018-02-15 VITALS — DIASTOLIC BLOOD PRESSURE: 65 MMHG | TEMPERATURE: 98.6 F | SYSTOLIC BLOOD PRESSURE: 180 MMHG

## 2018-02-15 VITALS — TEMPERATURE: 98.8 F | DIASTOLIC BLOOD PRESSURE: 59 MMHG | SYSTOLIC BLOOD PRESSURE: 174 MMHG

## 2018-02-15 LAB
PLATELET # BLD: 166 K/UL (ref 152–353)
POTASSIUM SERPL-SCNC: 3.9 MMOL/L (ref 3.6–5.2)
SODIUM SERPL-SCNC: 140 MMOL/L (ref 136–145)

## 2018-02-16 VITALS — DIASTOLIC BLOOD PRESSURE: 80 MMHG | SYSTOLIC BLOOD PRESSURE: 172 MMHG | TEMPERATURE: 98.8 F

## 2018-02-16 VITALS — TEMPERATURE: 99.2 F | DIASTOLIC BLOOD PRESSURE: 56 MMHG | SYSTOLIC BLOOD PRESSURE: 113 MMHG

## 2018-02-16 VITALS — SYSTOLIC BLOOD PRESSURE: 176 MMHG | DIASTOLIC BLOOD PRESSURE: 63 MMHG | TEMPERATURE: 98.8 F

## 2018-02-16 VITALS — SYSTOLIC BLOOD PRESSURE: 195 MMHG | TEMPERATURE: 99.3 F | DIASTOLIC BLOOD PRESSURE: 76 MMHG

## 2018-02-16 VITALS — SYSTOLIC BLOOD PRESSURE: 133 MMHG | DIASTOLIC BLOOD PRESSURE: 64 MMHG | TEMPERATURE: 98.8 F

## 2018-02-16 VITALS — DIASTOLIC BLOOD PRESSURE: 50 MMHG | TEMPERATURE: 98.9 F | SYSTOLIC BLOOD PRESSURE: 160 MMHG

## 2018-02-16 LAB
PLATELET # BLD: 165 K/UL (ref 152–353)
POTASSIUM SERPL-SCNC: 4.1 MMOL/L (ref 3.6–5.2)
SODIUM SERPL-SCNC: 138 MMOL/L (ref 136–145)

## 2018-02-17 VITALS — DIASTOLIC BLOOD PRESSURE: 72 MMHG | TEMPERATURE: 99 F | SYSTOLIC BLOOD PRESSURE: 188 MMHG

## 2018-02-17 VITALS — TEMPERATURE: 98.4 F | SYSTOLIC BLOOD PRESSURE: 162 MMHG | DIASTOLIC BLOOD PRESSURE: 60 MMHG

## 2018-02-17 VITALS — SYSTOLIC BLOOD PRESSURE: 121 MMHG | TEMPERATURE: 99.8 F | DIASTOLIC BLOOD PRESSURE: 55 MMHG

## 2018-02-17 VITALS — DIASTOLIC BLOOD PRESSURE: 70 MMHG | TEMPERATURE: 97.4 F | SYSTOLIC BLOOD PRESSURE: 188 MMHG

## 2018-02-17 VITALS — SYSTOLIC BLOOD PRESSURE: 185 MMHG | DIASTOLIC BLOOD PRESSURE: 65 MMHG | TEMPERATURE: 99.5 F

## 2018-02-17 VITALS — DIASTOLIC BLOOD PRESSURE: 74 MMHG | TEMPERATURE: 99.4 F | SYSTOLIC BLOOD PRESSURE: 186 MMHG

## 2018-02-17 LAB
PLATELET # BLD: 182 K/UL (ref 152–353)
POTASSIUM SERPL-SCNC: 4 MMOL/L (ref 3.6–5.2)
SODIUM SERPL-SCNC: 138 MMOL/L (ref 136–145)

## 2018-02-18 VITALS — TEMPERATURE: 97.9 F | SYSTOLIC BLOOD PRESSURE: 189 MMHG | DIASTOLIC BLOOD PRESSURE: 69 MMHG

## 2018-02-18 VITALS — DIASTOLIC BLOOD PRESSURE: 64 MMHG | TEMPERATURE: 98.6 F | SYSTOLIC BLOOD PRESSURE: 118 MMHG

## 2018-02-18 VITALS — DIASTOLIC BLOOD PRESSURE: 52 MMHG | TEMPERATURE: 98.8 F | SYSTOLIC BLOOD PRESSURE: 102 MMHG

## 2018-02-18 VITALS — SYSTOLIC BLOOD PRESSURE: 152 MMHG | DIASTOLIC BLOOD PRESSURE: 69 MMHG | TEMPERATURE: 97.8 F

## 2018-02-18 LAB
PLATELET # BLD: 190 K/UL (ref 152–353)
POTASSIUM SERPL-SCNC: 3.9 MMOL/L (ref 3.6–5.2)
SODIUM SERPL-SCNC: 138 MMOL/L (ref 136–145)

## 2018-02-19 ENCOUNTER — HOSPITAL ENCOUNTER (OUTPATIENT)
Dept: HOSPITAL 67 - INF | Age: 78
Discharge: HOME | End: 2018-02-19
Attending: FAMILY MEDICINE
Payer: COMMERCIAL

## 2018-02-19 DIAGNOSIS — L03.116: Primary | ICD-10-CM

## 2018-02-19 PROCEDURE — 96365 THER/PROPH/DIAG IV INF INIT: CPT

## 2018-02-20 ENCOUNTER — HOSPITAL ENCOUNTER (OUTPATIENT)
Dept: HOSPITAL 67 - INF | Age: 78
Discharge: HOME | End: 2018-02-20
Attending: FAMILY MEDICINE
Payer: COMMERCIAL

## 2018-02-20 VITALS — BODY MASS INDEX: 45.18 KG/M2 | WEIGHT: 255 LBS | HEIGHT: 63 IN

## 2018-02-20 VITALS — TEMPERATURE: 98.2 F | SYSTOLIC BLOOD PRESSURE: 131 MMHG | DIASTOLIC BLOOD PRESSURE: 67 MMHG

## 2018-02-20 VITALS — SYSTOLIC BLOOD PRESSURE: 147 MMHG | DIASTOLIC BLOOD PRESSURE: 87 MMHG

## 2018-02-20 DIAGNOSIS — I50.9: ICD-10-CM

## 2018-02-20 DIAGNOSIS — I10: ICD-10-CM

## 2018-02-20 DIAGNOSIS — N18.2: Primary | ICD-10-CM

## 2018-02-20 LAB
PLATELET # BLD: 204 K/UL (ref 152–353)
POTASSIUM SERPL-SCNC: 4.1 MMOL/L (ref 3.6–5.2)
SODIUM SERPL-SCNC: 142.9 MMOL/L (ref 136–145)

## 2018-02-20 PROCEDURE — 96365 THER/PROPH/DIAG IV INF INIT: CPT

## 2018-02-20 PROCEDURE — 85027 COMPLETE CBC AUTOMATED: CPT

## 2018-02-20 PROCEDURE — 80053 COMPREHEN METABOLIC PANEL: CPT

## 2018-02-21 ENCOUNTER — HOSPITAL ENCOUNTER (OUTPATIENT)
Dept: HOSPITAL 67 - INF | Age: 78
Discharge: HOME | End: 2018-02-21
Attending: FAMILY MEDICINE
Payer: COMMERCIAL

## 2018-02-21 VITALS — WEIGHT: 115.44 LBS | HEIGHT: 63 IN | BODY MASS INDEX: 20.45 KG/M2

## 2018-02-21 DIAGNOSIS — L03.116: Primary | ICD-10-CM

## 2018-02-21 PROCEDURE — 96365 THER/PROPH/DIAG IV INF INIT: CPT

## 2018-02-26 ENCOUNTER — HOSPITAL ENCOUNTER (OUTPATIENT)
Dept: HOSPITAL 67 - LAB | Age: 78
Discharge: HOME | End: 2018-02-26
Attending: FAMILY MEDICINE
Payer: COMMERCIAL

## 2018-02-26 DIAGNOSIS — N18.4: Primary | ICD-10-CM

## 2018-02-26 DIAGNOSIS — I50.89: ICD-10-CM

## 2018-02-26 LAB
PLATELET # BLD: 170 K/UL (ref 152–353)
POTASSIUM SERPL-SCNC: 3.7 MMOL/L (ref 3.6–5.2)
SODIUM SERPL-SCNC: 133 MMOL/L (ref 136–145)

## 2018-02-26 PROCEDURE — 85027 COMPLETE CBC AUTOMATED: CPT

## 2018-02-26 PROCEDURE — 80053 COMPREHEN METABOLIC PANEL: CPT

## 2018-03-05 ENCOUNTER — HOSPITAL ENCOUNTER (OUTPATIENT)
Dept: HOSPITAL 67 - LAB | Age: 78
Discharge: HOME | End: 2018-03-05
Attending: FAMILY MEDICINE
Payer: COMMERCIAL

## 2018-03-05 DIAGNOSIS — N18.4: ICD-10-CM

## 2018-03-05 DIAGNOSIS — I50.22: Primary | ICD-10-CM

## 2018-03-05 LAB
PLATELET # BLD: 257 K/UL (ref 152–353)
POTASSIUM SERPL-SCNC: 4.2 MMOL/L (ref 3.6–5.2)
SODIUM SERPL-SCNC: 136 MMOL/L (ref 136–145)

## 2018-03-05 PROCEDURE — 80053 COMPREHEN METABOLIC PANEL: CPT

## 2018-03-05 PROCEDURE — 85027 COMPLETE CBC AUTOMATED: CPT

## 2018-03-12 ENCOUNTER — HOSPITAL ENCOUNTER (OUTPATIENT)
Dept: HOSPITAL 67 - LAB | Age: 78
Discharge: HOME | End: 2018-03-12
Attending: FAMILY MEDICINE
Payer: COMMERCIAL

## 2018-03-12 DIAGNOSIS — I12.9: Primary | ICD-10-CM

## 2018-03-12 DIAGNOSIS — I50.9: ICD-10-CM

## 2018-03-12 DIAGNOSIS — D64.89: ICD-10-CM

## 2018-03-12 DIAGNOSIS — N18.2: ICD-10-CM

## 2018-03-12 LAB
PLATELET # BLD: 224 K/UL (ref 152–353)
POTASSIUM SERPL-SCNC: 4.1 MMOL/L (ref 3.6–5.2)
SODIUM SERPL-SCNC: 136 MMOL/L (ref 136–145)

## 2018-03-12 PROCEDURE — 80053 COMPREHEN METABOLIC PANEL: CPT

## 2018-03-12 PROCEDURE — 85027 COMPLETE CBC AUTOMATED: CPT

## 2018-03-19 ENCOUNTER — HOSPITAL ENCOUNTER (OUTPATIENT)
Dept: HOSPITAL 67 - LAB | Age: 78
Discharge: HOME | End: 2018-03-19
Attending: FAMILY MEDICINE
Payer: COMMERCIAL

## 2018-03-19 DIAGNOSIS — I12.9: Primary | ICD-10-CM

## 2018-03-19 DIAGNOSIS — N18.4: ICD-10-CM

## 2018-03-19 DIAGNOSIS — I50.9: ICD-10-CM

## 2018-03-19 LAB
PLATELET # BLD: 159 K/UL (ref 152–353)
POTASSIUM SERPL-SCNC: 4.2 MMOL/L (ref 3.6–5.2)
SODIUM SERPL-SCNC: 134 MMOL/L (ref 136–145)

## 2018-03-19 PROCEDURE — 85027 COMPLETE CBC AUTOMATED: CPT

## 2018-03-19 PROCEDURE — 80053 COMPREHEN METABOLIC PANEL: CPT

## 2018-03-27 ENCOUNTER — HOSPITAL ENCOUNTER (OUTPATIENT)
Dept: HOSPITAL 67 - LAB | Age: 78
Discharge: HOME | End: 2018-03-27
Attending: FAMILY MEDICINE
Payer: COMMERCIAL

## 2018-03-27 DIAGNOSIS — N18.4: Primary | ICD-10-CM

## 2018-03-27 DIAGNOSIS — I50.9: ICD-10-CM

## 2018-03-27 LAB
PLATELET # BLD: 209 K/UL (ref 152–353)
POTASSIUM SERPL-SCNC: 4.1 MMOL/L (ref 3.6–5.2)
SODIUM SERPL-SCNC: 137 MMOL/L (ref 136–145)

## 2018-03-27 PROCEDURE — 80053 COMPREHEN METABOLIC PANEL: CPT

## 2018-03-27 PROCEDURE — 85027 COMPLETE CBC AUTOMATED: CPT

## 2018-04-03 ENCOUNTER — HOSPITAL ENCOUNTER (OUTPATIENT)
Dept: HOSPITAL 67 - LAB | Age: 78
Discharge: HOME | End: 2018-04-03
Attending: FAMILY MEDICINE
Payer: COMMERCIAL

## 2018-04-03 DIAGNOSIS — N18.4: Primary | ICD-10-CM

## 2018-04-03 DIAGNOSIS — I50.9: ICD-10-CM

## 2018-04-03 LAB
PLATELET # BLD: 203 K/UL (ref 152–353)
POTASSIUM SERPL-SCNC: 4.5 MMOL/L (ref 3.6–5.2)
SODIUM SERPL-SCNC: 135 MMOL/L (ref 136–145)

## 2018-04-03 PROCEDURE — 80053 COMPREHEN METABOLIC PANEL: CPT

## 2018-04-03 PROCEDURE — 85027 COMPLETE CBC AUTOMATED: CPT

## 2018-04-10 ENCOUNTER — HOSPITAL ENCOUNTER (OUTPATIENT)
Dept: HOSPITAL 67 - LAB | Age: 78
Discharge: HOME | End: 2018-04-10
Attending: FAMILY MEDICINE
Payer: COMMERCIAL

## 2018-04-10 DIAGNOSIS — N18.4: Primary | ICD-10-CM

## 2018-04-10 DIAGNOSIS — I50.22: ICD-10-CM

## 2018-04-10 LAB
PLATELET # BLD: 181 K/UL (ref 152–353)
POTASSIUM SERPL-SCNC: 4.5 MMOL/L (ref 3.6–5.2)
SODIUM SERPL-SCNC: 141 MMOL/L (ref 136–145)

## 2018-04-10 PROCEDURE — 80053 COMPREHEN METABOLIC PANEL: CPT

## 2018-04-10 PROCEDURE — 85027 COMPLETE CBC AUTOMATED: CPT

## 2018-04-18 ENCOUNTER — HOSPITAL ENCOUNTER (OUTPATIENT)
Dept: HOSPITAL 67 - LAB | Age: 78
Discharge: HOME | End: 2018-04-18
Attending: FAMILY MEDICINE
Payer: COMMERCIAL

## 2018-04-18 DIAGNOSIS — I50.9: ICD-10-CM

## 2018-04-18 DIAGNOSIS — N18.2: Primary | ICD-10-CM

## 2018-04-18 LAB
PLATELET # BLD: 179 K/UL (ref 152–353)
POTASSIUM SERPL-SCNC: 4.3 MMOL/L (ref 3.6–5.2)
SODIUM SERPL-SCNC: 136 MMOL/L (ref 136–145)

## 2018-04-18 PROCEDURE — 85027 COMPLETE CBC AUTOMATED: CPT

## 2018-04-18 PROCEDURE — 80053 COMPREHEN METABOLIC PANEL: CPT

## 2018-05-02 ENCOUNTER — HOSPITAL ENCOUNTER (OUTPATIENT)
Dept: HOSPITAL 67 - LAB | Age: 78
Discharge: HOME | End: 2018-05-02
Attending: FAMILY MEDICINE
Payer: COMMERCIAL

## 2018-05-02 DIAGNOSIS — N18.2: ICD-10-CM

## 2018-05-02 DIAGNOSIS — I50.9: Primary | ICD-10-CM

## 2018-05-02 LAB
PLATELET # BLD: 204 K/UL (ref 152–353)
POTASSIUM SERPL-SCNC: 4.4 MMOL/L (ref 3.6–5.2)
SODIUM SERPL-SCNC: 137 MMOL/L (ref 136–145)

## 2018-05-02 PROCEDURE — 80053 COMPREHEN METABOLIC PANEL: CPT

## 2018-05-02 PROCEDURE — 85027 COMPLETE CBC AUTOMATED: CPT

## 2018-05-03 ENCOUNTER — HOSPITAL ENCOUNTER (INPATIENT)
Dept: HOSPITAL 67 - ED | Age: 78
LOS: 5 days | Discharge: HOME HEALTH | DRG: 872 | End: 2018-05-08
Attending: FAMILY MEDICINE | Admitting: FAMILY MEDICINE
Payer: COMMERCIAL

## 2018-05-03 ENCOUNTER — HOSPITAL ENCOUNTER (OUTPATIENT)
Dept: HOSPITAL 67 - AMB | Age: 78
Discharge: ANOTHER HOSPITAL | End: 2018-05-03
Payer: COMMERCIAL

## 2018-05-03 VITALS — DIASTOLIC BLOOD PRESSURE: 70 MMHG | SYSTOLIC BLOOD PRESSURE: 169 MMHG | TEMPERATURE: 100.2 F

## 2018-05-03 VITALS — BODY MASS INDEX: 48.15 KG/M2 | BODY MASS INDEX: 48.15 KG/M2 | HEIGHT: 65 IN | WEIGHT: 289 LBS

## 2018-05-03 VITALS — TEMPERATURE: 99.9 F

## 2018-05-03 VITALS — DIASTOLIC BLOOD PRESSURE: 66 MMHG | SYSTOLIC BLOOD PRESSURE: 187 MMHG | TEMPERATURE: 100.5 F

## 2018-05-03 VITALS — TEMPERATURE: 100.1 F | DIASTOLIC BLOOD PRESSURE: 64 MMHG | SYSTOLIC BLOOD PRESSURE: 156 MMHG

## 2018-05-03 VITALS — DIASTOLIC BLOOD PRESSURE: 88 MMHG | TEMPERATURE: 91 F | SYSTOLIC BLOOD PRESSURE: 172 MMHG

## 2018-05-03 VITALS — TEMPERATURE: 100.8 F

## 2018-05-03 VITALS — SYSTOLIC BLOOD PRESSURE: 187 MMHG | TEMPERATURE: 100.5 F | DIASTOLIC BLOOD PRESSURE: 66 MMHG

## 2018-05-03 DIAGNOSIS — D72.828: ICD-10-CM

## 2018-05-03 DIAGNOSIS — I12.9: ICD-10-CM

## 2018-05-03 DIAGNOSIS — E66.01: ICD-10-CM

## 2018-05-03 DIAGNOSIS — R41.82: ICD-10-CM

## 2018-05-03 DIAGNOSIS — E11.22: ICD-10-CM

## 2018-05-03 DIAGNOSIS — M41.80: ICD-10-CM

## 2018-05-03 DIAGNOSIS — E03.8: ICD-10-CM

## 2018-05-03 DIAGNOSIS — A41.89: Primary | ICD-10-CM

## 2018-05-03 DIAGNOSIS — R50.9: ICD-10-CM

## 2018-05-03 DIAGNOSIS — E83.42: ICD-10-CM

## 2018-05-03 DIAGNOSIS — E11.42: ICD-10-CM

## 2018-05-03 DIAGNOSIS — D50.8: ICD-10-CM

## 2018-05-03 DIAGNOSIS — R53.1: Primary | ICD-10-CM

## 2018-05-03 DIAGNOSIS — N18.4: ICD-10-CM

## 2018-05-03 LAB
PLATELET # BLD: 187 K/UL (ref 152–353)
POTASSIUM SERPL-SCNC: 4.3 MMOL/L (ref 3.6–5.2)
SODIUM SERPL-SCNC: 135 MMOL/L (ref 136–145)

## 2018-05-03 PROCEDURE — 83735 ASSAY OF MAGNESIUM: CPT

## 2018-05-03 PROCEDURE — P9016 RBC LEUKOCYTES REDUCED: HCPCS

## 2018-05-03 PROCEDURE — 94640 AIRWAY INHALATION TREATMENT: CPT

## 2018-05-03 PROCEDURE — A0427 ALS1-EMERGENCY: HCPCS

## 2018-05-03 PROCEDURE — 86901 BLOOD TYPING SEROLOGIC RH(D): CPT

## 2018-05-03 PROCEDURE — 94664 DEMO&/EVAL PT USE INHALER: CPT

## 2018-05-03 PROCEDURE — 84484 ASSAY OF TROPONIN QUANT: CPT

## 2018-05-03 PROCEDURE — 86922 COMPATIBILITY TEST ANTIGLOB: CPT

## 2018-05-03 PROCEDURE — 96365 THER/PROPH/DIAG IV INF INIT: CPT

## 2018-05-03 PROCEDURE — 84100 ASSAY OF PHOSPHORUS: CPT

## 2018-05-03 PROCEDURE — 36415 COLL VENOUS BLD VENIPUNCTURE: CPT

## 2018-05-03 PROCEDURE — 81000 URINALYSIS NONAUTO W/SCOPE: CPT

## 2018-05-03 PROCEDURE — 99284 EMERGENCY DEPT VISIT MOD MDM: CPT

## 2018-05-03 PROCEDURE — 94668 MNPJ CHEST WALL SBSQ: CPT

## 2018-05-03 PROCEDURE — 96361 HYDRATE IV INFUSION ADD-ON: CPT

## 2018-05-03 PROCEDURE — 51702 INSERT TEMP BLADDER CATH: CPT

## 2018-05-03 PROCEDURE — 80048 BASIC METABOLIC PNL TOTAL CA: CPT

## 2018-05-03 PROCEDURE — 80053 COMPREHEN METABOLIC PANEL: CPT

## 2018-05-03 PROCEDURE — 86850 RBC ANTIBODY SCREEN: CPT

## 2018-05-03 PROCEDURE — 83605 ASSAY OF LACTIC ACID: CPT

## 2018-05-03 PROCEDURE — 86900 BLOOD TYPING SEROLOGIC ABO: CPT

## 2018-05-03 PROCEDURE — 85027 COMPLETE CBC AUTOMATED: CPT

## 2018-05-03 PROCEDURE — 87040 BLOOD CULTURE FOR BACTERIA: CPT

## 2018-05-03 PROCEDURE — 36430 TRANSFUSION BLD/BLD COMPNT: CPT

## 2018-05-03 PROCEDURE — 85610 PROTHROMBIN TIME: CPT

## 2018-05-03 PROCEDURE — 94760 N-INVAS EAR/PLS OXIMETRY 1: CPT

## 2018-05-03 PROCEDURE — 82550 ASSAY OF CK (CPK): CPT

## 2018-05-03 PROCEDURE — 96367 TX/PROPH/DG ADDL SEQ IV INF: CPT

## 2018-05-03 PROCEDURE — 82948 REAGENT STRIP/BLOOD GLUCOSE: CPT

## 2018-05-03 PROCEDURE — 96374 THER/PROPH/DIAG INJ IV PUSH: CPT

## 2018-05-03 PROCEDURE — A0425 GROUND MILEAGE: HCPCS

## 2018-05-04 VITALS — TEMPERATURE: 100.7 F | DIASTOLIC BLOOD PRESSURE: 51 MMHG | SYSTOLIC BLOOD PRESSURE: 176 MMHG

## 2018-05-04 VITALS — TEMPERATURE: 99.5 F | DIASTOLIC BLOOD PRESSURE: 48 MMHG | SYSTOLIC BLOOD PRESSURE: 1548 MMHG

## 2018-05-04 VITALS — SYSTOLIC BLOOD PRESSURE: 149 MMHG | DIASTOLIC BLOOD PRESSURE: 68 MMHG | TEMPERATURE: 99.6 F

## 2018-05-04 VITALS — DIASTOLIC BLOOD PRESSURE: 53 MMHG | SYSTOLIC BLOOD PRESSURE: 161 MMHG | TEMPERATURE: 100 F

## 2018-05-04 VITALS — DIASTOLIC BLOOD PRESSURE: 52 MMHG | TEMPERATURE: 98.8 F | SYSTOLIC BLOOD PRESSURE: 161 MMHG

## 2018-05-04 VITALS — SYSTOLIC BLOOD PRESSURE: 174 MMHG | TEMPERATURE: 99.8 F | DIASTOLIC BLOOD PRESSURE: 51 MMHG

## 2018-05-04 LAB
PLATELET # BLD: 153 K/UL (ref 152–353)
POTASSIUM SERPL-SCNC: 3.7 MMOL/L (ref 3.6–5.2)
SODIUM SERPL-SCNC: 137 MMOL/L (ref 136–145)

## 2018-05-04 PROCEDURE — 0T9B70Z DRAINAGE OF BLADDER WITH DRAINAGE DEVICE, VIA NATURAL OR ARTIFICIAL OPENING: ICD-10-PCS | Performed by: FAMILY MEDICINE

## 2018-05-05 VITALS — SYSTOLIC BLOOD PRESSURE: 158 MMHG | DIASTOLIC BLOOD PRESSURE: 44 MMHG | TEMPERATURE: 99.9 F

## 2018-05-05 VITALS — TEMPERATURE: 99 F | DIASTOLIC BLOOD PRESSURE: 54 MMHG | SYSTOLIC BLOOD PRESSURE: 160 MMHG

## 2018-05-05 VITALS — DIASTOLIC BLOOD PRESSURE: 74 MMHG | TEMPERATURE: 98.5 F | SYSTOLIC BLOOD PRESSURE: 143 MMHG

## 2018-05-05 VITALS — TEMPERATURE: 99.9 F | DIASTOLIC BLOOD PRESSURE: 47 MMHG | SYSTOLIC BLOOD PRESSURE: 1243 MMHG

## 2018-05-05 VITALS — SYSTOLIC BLOOD PRESSURE: 187 MMHG | DIASTOLIC BLOOD PRESSURE: 52 MMHG | TEMPERATURE: 98.5 F

## 2018-05-05 VITALS — SYSTOLIC BLOOD PRESSURE: 162 MMHG | DIASTOLIC BLOOD PRESSURE: 65 MMHG | TEMPERATURE: 99.2 F

## 2018-05-05 LAB
PLATELET # BLD: 135 K/UL (ref 152–353)
POTASSIUM SERPL-SCNC: 3.9 MMOL/L (ref 3.6–5.2)
SODIUM SERPL-SCNC: 137 MMOL/L (ref 136–145)

## 2018-05-06 VITALS — TEMPERATURE: 98.8 F | SYSTOLIC BLOOD PRESSURE: 178 MMHG | DIASTOLIC BLOOD PRESSURE: 56 MMHG

## 2018-05-06 VITALS — TEMPERATURE: 99.8 F | SYSTOLIC BLOOD PRESSURE: 176 MMHG | DIASTOLIC BLOOD PRESSURE: 55 MMHG

## 2018-05-06 VITALS — TEMPERATURE: 98.1 F | SYSTOLIC BLOOD PRESSURE: 195 MMHG | DIASTOLIC BLOOD PRESSURE: 70 MMHG

## 2018-05-06 VITALS — TEMPERATURE: 100.1 F | DIASTOLIC BLOOD PRESSURE: 62 MMHG | SYSTOLIC BLOOD PRESSURE: 199 MMHG

## 2018-05-06 VITALS — TEMPERATURE: 98.7 F | SYSTOLIC BLOOD PRESSURE: 185 MMHG | DIASTOLIC BLOOD PRESSURE: 56 MMHG

## 2018-05-06 VITALS — DIASTOLIC BLOOD PRESSURE: 65 MMHG | TEMPERATURE: 98.6 F | SYSTOLIC BLOOD PRESSURE: 196 MMHG

## 2018-05-06 LAB
PLATELET # BLD: 143 K/UL (ref 152–353)
POTASSIUM SERPL-SCNC: 3.7 MMOL/L (ref 3.6–5.2)
SODIUM SERPL-SCNC: 137 MMOL/L (ref 136–145)

## 2018-05-07 VITALS — SYSTOLIC BLOOD PRESSURE: 175 MMHG | TEMPERATURE: 100 F | DIASTOLIC BLOOD PRESSURE: 49 MMHG

## 2018-05-07 VITALS — DIASTOLIC BLOOD PRESSURE: 83 MMHG | SYSTOLIC BLOOD PRESSURE: 128 MMHG

## 2018-05-07 VITALS — SYSTOLIC BLOOD PRESSURE: 128 MMHG | DIASTOLIC BLOOD PRESSURE: 80 MMHG

## 2018-05-07 VITALS — DIASTOLIC BLOOD PRESSURE: 61 MMHG | TEMPERATURE: 98.9 F | SYSTOLIC BLOOD PRESSURE: 196 MMHG

## 2018-05-07 VITALS — DIASTOLIC BLOOD PRESSURE: 45 MMHG | SYSTOLIC BLOOD PRESSURE: 161 MMHG | TEMPERATURE: 100.2 F

## 2018-05-07 VITALS — TEMPERATURE: 98.9 F | DIASTOLIC BLOOD PRESSURE: 71 MMHG | SYSTOLIC BLOOD PRESSURE: 188 MMHG

## 2018-05-07 VITALS — DIASTOLIC BLOOD PRESSURE: 46 MMHG | SYSTOLIC BLOOD PRESSURE: 160 MMHG | TEMPERATURE: 100 F

## 2018-05-07 VITALS — SYSTOLIC BLOOD PRESSURE: 160 MMHG | TEMPERATURE: 99.3 F | DIASTOLIC BLOOD PRESSURE: 82 MMHG

## 2018-05-07 LAB — PLATELET # BLD: 144 K/UL (ref 152–353)

## 2018-05-08 VITALS — DIASTOLIC BLOOD PRESSURE: 78 MMHG | TEMPERATURE: 99.8 F | SYSTOLIC BLOOD PRESSURE: 158 MMHG

## 2018-05-08 VITALS — DIASTOLIC BLOOD PRESSURE: 62 MMHG | TEMPERATURE: 98.8 F | SYSTOLIC BLOOD PRESSURE: 176 MMHG

## 2018-05-08 LAB — PLATELET # BLD: 166 K/UL (ref 152–353)

## 2018-05-11 ENCOUNTER — HOSPITAL ENCOUNTER (OUTPATIENT)
Dept: HOSPITAL 67 - AMB | Age: 78
Discharge: ANOTHER HOSPITAL | End: 2018-05-11
Payer: COMMERCIAL

## 2018-05-11 DIAGNOSIS — R52: Primary | ICD-10-CM

## 2018-05-11 PROCEDURE — A0429 BLS-EMERGENCY: HCPCS

## 2018-05-11 PROCEDURE — A0425 GROUND MILEAGE: HCPCS

## 2018-05-15 ENCOUNTER — HOSPITAL ENCOUNTER (OUTPATIENT)
Dept: HOSPITAL 67 - AMB | Age: 78
Discharge: ANOTHER HOSPITAL | End: 2018-05-15
Payer: COMMERCIAL

## 2018-05-15 ENCOUNTER — HOSPITAL ENCOUNTER (INPATIENT)
Dept: HOSPITAL 67 - ED | Age: 78
LOS: 6 days | Discharge: NURSING, REHAB OR LONG-TERM CARE | DRG: 813 | End: 2018-05-21
Attending: FAMILY MEDICINE | Admitting: FAMILY MEDICINE
Payer: COMMERCIAL

## 2018-05-15 VITALS — SYSTOLIC BLOOD PRESSURE: 179 MMHG | DIASTOLIC BLOOD PRESSURE: 57 MMHG | TEMPERATURE: 98.6 F

## 2018-05-15 VITALS
HEIGHT: 63 IN | WEIGHT: 286.06 LBS | BODY MASS INDEX: 50.68 KG/M2 | WEIGHT: 286.06 LBS | BODY MASS INDEX: 50.68 KG/M2 | HEIGHT: 63 IN

## 2018-05-15 VITALS — SYSTOLIC BLOOD PRESSURE: 157 MMHG | DIASTOLIC BLOOD PRESSURE: 99 MMHG | TEMPERATURE: 97.2 F

## 2018-05-15 VITALS — SYSTOLIC BLOOD PRESSURE: 186 MMHG | DIASTOLIC BLOOD PRESSURE: 59 MMHG | TEMPERATURE: 98.8 F

## 2018-05-15 DIAGNOSIS — D64.89: ICD-10-CM

## 2018-05-15 DIAGNOSIS — R53.1: Primary | ICD-10-CM
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
## 2018-05-15 DIAGNOSIS — D68.32: Primary | ICD-10-CM

## 2018-05-15 DIAGNOSIS — E03.8: ICD-10-CM

## 2018-05-15 DIAGNOSIS — T45.515A: ICD-10-CM

## 2018-05-15 DIAGNOSIS — M41.80: ICD-10-CM

## 2018-05-15 DIAGNOSIS — R53.1: ICD-10-CM

## 2018-05-15 DIAGNOSIS — E11.42: ICD-10-CM

## 2018-05-15 DIAGNOSIS — R62.7: ICD-10-CM

## 2018-05-15 DIAGNOSIS — I12.9: ICD-10-CM

## 2018-05-15 DIAGNOSIS — E78.4: ICD-10-CM

## 2018-05-15 DIAGNOSIS — K21.9: ICD-10-CM

## 2018-05-15 DIAGNOSIS — E11.22: ICD-10-CM

## 2018-05-15 DIAGNOSIS — N18.4: ICD-10-CM

## 2018-05-15 DIAGNOSIS — I25.10: ICD-10-CM

## 2018-05-15 LAB
APTT BLD: 50.7 SECONDS (ref 24.5–33.6)
PLATELET # BLD: 315 K/UL (ref 152–353)
POTASSIUM SERPL-SCNC: 4.9 MMOL/L (ref 3.6–5.2)
SODIUM SERPL-SCNC: 133 MMOL/L (ref 136–145)

## 2018-05-15 PROCEDURE — 80053 COMPREHEN METABOLIC PANEL: CPT

## 2018-05-15 PROCEDURE — 83735 ASSAY OF MAGNESIUM: CPT

## 2018-05-15 PROCEDURE — 36430 TRANSFUSION BLD/BLD COMPNT: CPT

## 2018-05-15 PROCEDURE — A0429 BLS-EMERGENCY: HCPCS

## 2018-05-15 PROCEDURE — 85730 THROMBOPLASTIN TIME PARTIAL: CPT

## 2018-05-15 PROCEDURE — P9016 RBC LEUKOCYTES REDUCED: HCPCS

## 2018-05-15 PROCEDURE — 94760 N-INVAS EAR/PLS OXIMETRY 1: CPT

## 2018-05-15 PROCEDURE — 80048 BASIC METABOLIC PNL TOTAL CA: CPT

## 2018-05-15 PROCEDURE — 82948 REAGENT STRIP/BLOOD GLUCOSE: CPT

## 2018-05-15 PROCEDURE — 94664 DEMO&/EVAL PT USE INHALER: CPT

## 2018-05-15 PROCEDURE — 86850 RBC ANTIBODY SCREEN: CPT

## 2018-05-15 PROCEDURE — 85027 COMPLETE CBC AUTOMATED: CPT

## 2018-05-15 PROCEDURE — 94640 AIRWAY INHALATION TREATMENT: CPT

## 2018-05-15 PROCEDURE — 86901 BLOOD TYPING SEROLOGIC RH(D): CPT

## 2018-05-15 PROCEDURE — 96372 THER/PROPH/DIAG INJ SC/IM: CPT

## 2018-05-15 PROCEDURE — 85610 PROTHROMBIN TIME: CPT

## 2018-05-15 PROCEDURE — 99283 EMERGENCY DEPT VISIT LOW MDM: CPT

## 2018-05-15 PROCEDURE — 86900 BLOOD TYPING SEROLOGIC ABO: CPT

## 2018-05-15 PROCEDURE — 36415 COLL VENOUS BLD VENIPUNCTURE: CPT

## 2018-05-15 PROCEDURE — 94668 MNPJ CHEST WALL SBSQ: CPT

## 2018-05-15 PROCEDURE — 86922 COMPATIBILITY TEST ANTIGLOB: CPT

## 2018-05-15 PROCEDURE — A0425 GROUND MILEAGE: HCPCS

## 2018-05-16 VITALS — SYSTOLIC BLOOD PRESSURE: 146 MMHG | TEMPERATURE: 97.7 F | DIASTOLIC BLOOD PRESSURE: 52 MMHG

## 2018-05-16 VITALS — TEMPERATURE: 98.8 F | SYSTOLIC BLOOD PRESSURE: 134 MMHG | DIASTOLIC BLOOD PRESSURE: 53 MMHG

## 2018-05-16 VITALS — SYSTOLIC BLOOD PRESSURE: 142 MMHG | DIASTOLIC BLOOD PRESSURE: 39 MMHG | TEMPERATURE: 98.2 F

## 2018-05-16 VITALS — DIASTOLIC BLOOD PRESSURE: 57 MMHG | TEMPERATURE: 98.2 F | SYSTOLIC BLOOD PRESSURE: 186 MMHG

## 2018-05-16 VITALS — DIASTOLIC BLOOD PRESSURE: 52 MMHG | SYSTOLIC BLOOD PRESSURE: 178 MMHG | TEMPERATURE: 98.1 F

## 2018-05-16 LAB
APTT BLD: 51.6 SECONDS (ref 24.5–33.6)
PLATELET # BLD: 293 K/UL (ref 152–353)
PLATELET # BLD: 302 K/UL (ref 152–353)
POTASSIUM SERPL-SCNC: 5.2 MMOL/L (ref 3.6–5.2)
SODIUM SERPL-SCNC: 134 MMOL/L (ref 136–145)

## 2018-05-16 NOTE — NUR
05/16/18 0552
LAB CALLED WITH CRITICAL LAB: CREATININE 6.8
DR. BEARD WAS NOTIFIED AND GAVE NEW ORDER FOR RENAL DIET.

## 2018-05-17 VITALS — DIASTOLIC BLOOD PRESSURE: 63 MMHG | SYSTOLIC BLOOD PRESSURE: 173 MMHG | TEMPERATURE: 98.6 F

## 2018-05-17 VITALS — TEMPERATURE: 99.4 F | DIASTOLIC BLOOD PRESSURE: 53 MMHG | SYSTOLIC BLOOD PRESSURE: 149 MMHG

## 2018-05-17 VITALS — TEMPERATURE: 98.6 F | SYSTOLIC BLOOD PRESSURE: 164 MMHG | DIASTOLIC BLOOD PRESSURE: 67 MMHG

## 2018-05-17 VITALS — DIASTOLIC BLOOD PRESSURE: 65 MMHG | SYSTOLIC BLOOD PRESSURE: 101 MMHG | TEMPERATURE: 98.6 F

## 2018-05-17 LAB
APTT BLD: 35.1 SECONDS (ref 24.5–33.6)
PLATELET # BLD: 294 K/UL (ref 152–353)
PLATELET # BLD: 307 K/UL (ref 152–353)
POTASSIUM SERPL-SCNC: 4.5 MMOL/L (ref 3.6–5.2)
POTASSIUM SERPL-SCNC: 5.3 MMOL/L (ref 3.6–5.2)
SODIUM SERPL-SCNC: 134 MMOL/L (ref 136–145)
SODIUM SERPL-SCNC: 134 MMOL/L (ref 136–145)

## 2018-05-17 PROCEDURE — 30233N1 TRANSFUSION OF NONAUTOLOGOUS RED BLOOD CELLS INTO PERIPHERAL VEIN, PERCUTANEOUS APPROACH: ICD-10-PCS | Performed by: PEDIATRICS

## 2018-05-18 VITALS — SYSTOLIC BLOOD PRESSURE: 185 MMHG | TEMPERATURE: 98.5 F | DIASTOLIC BLOOD PRESSURE: 59 MMHG

## 2018-05-18 VITALS — TEMPERATURE: 97.8 F | DIASTOLIC BLOOD PRESSURE: 56 MMHG | SYSTOLIC BLOOD PRESSURE: 188 MMHG

## 2018-05-18 VITALS — SYSTOLIC BLOOD PRESSURE: 175 MMHG | TEMPERATURE: 98.8 F | DIASTOLIC BLOOD PRESSURE: 53 MMHG

## 2018-05-18 VITALS — TEMPERATURE: 98.8 F | SYSTOLIC BLOOD PRESSURE: 150 MMHG | DIASTOLIC BLOOD PRESSURE: 49 MMHG

## 2018-05-18 LAB
APTT BLD: 31.6 SECONDS (ref 24.5–33.6)
PLATELET # BLD: 273 K/UL (ref 152–353)
POTASSIUM SERPL-SCNC: 4.6 MMOL/L (ref 3.6–5.2)
SODIUM SERPL-SCNC: 136 MMOL/L (ref 136–145)

## 2018-05-18 NOTE — NUR
DR BEARD AT BEDSIDE MAKING ROUNDS. NEW ORDERS RECEIVED TO D/C PT TO SWING
BED. SPOKE WITH UR AT THIS TIME ABOUT SWING BED PLACEMENT. UR INSTRUCTED TO
CALL PT ABOUT EVALUATION. EVALUATION DONE ON MAY 17 WAS INCOMPLETE DUE TO PT
NONCOMPLIANCE. EMILY TOMAS RN SPOKE TO PT ABOUT REQUIREMENTS FOR SWING BED
PLACEMENT. PT AGREEDED TO COMPLIANCE. DR BEARD NOTIFIED. WILL HAVE TO WAIT
TO DO EVALUATION FOR SWING BED PLACEMENT ON MONDAY 5-21-18.

## 2018-05-18 NOTE — NUR
PT REQUEST ASSISTANCE FOR A PILLOW TO BE PLACED UNDERNEATH BOTTOM. TWO PERSON
ASSIST ATTEMPT WITH PT EDUCATED ON HOW TO HELP ASSIST NURSE WITH TRANSFER. PT
NONCOMPLIANT.

## 2018-05-18 NOTE — NUR
PHARMACY CALLED NURSE STATING THAT BACTRIM DS IS CONTRAINDICATED WITH PT
CREATNINE OF 6.9. DR BEARD NOTIFIED AND STATED TO NURSE TO DISCUSS WITH
PHARMACY ABOUT CHANGING IT TO CEFLEX.

## 2018-05-19 VITALS — TEMPERATURE: 98.3 F | SYSTOLIC BLOOD PRESSURE: 166 MMHG | DIASTOLIC BLOOD PRESSURE: 54 MMHG

## 2018-05-19 VITALS — DIASTOLIC BLOOD PRESSURE: 53 MMHG | SYSTOLIC BLOOD PRESSURE: 169 MMHG | TEMPERATURE: 98.3 F

## 2018-05-19 VITALS — SYSTOLIC BLOOD PRESSURE: 148 MMHG | TEMPERATURE: 98.6 F | DIASTOLIC BLOOD PRESSURE: 60 MMHG

## 2018-05-19 VITALS — DIASTOLIC BLOOD PRESSURE: 61 MMHG | TEMPERATURE: 98.4 F | SYSTOLIC BLOOD PRESSURE: 154 MMHG

## 2018-05-19 VITALS — TEMPERATURE: 98.3 F | SYSTOLIC BLOOD PRESSURE: 173 MMHG | DIASTOLIC BLOOD PRESSURE: 61 MMHG

## 2018-05-19 VITALS — SYSTOLIC BLOOD PRESSURE: 169 MMHG | DIASTOLIC BLOOD PRESSURE: 68 MMHG | TEMPERATURE: 98.3 F

## 2018-05-19 VITALS — SYSTOLIC BLOOD PRESSURE: 123 MMHG | DIASTOLIC BLOOD PRESSURE: 54 MMHG | TEMPERATURE: 98.8 F

## 2018-05-20 VITALS — DIASTOLIC BLOOD PRESSURE: 53 MMHG | SYSTOLIC BLOOD PRESSURE: 143 MMHG | TEMPERATURE: 98 F

## 2018-05-20 VITALS — TEMPERATURE: 98.4 F | DIASTOLIC BLOOD PRESSURE: 64 MMHG | SYSTOLIC BLOOD PRESSURE: 154 MMHG

## 2018-05-20 VITALS — SYSTOLIC BLOOD PRESSURE: 147 MMHG | DIASTOLIC BLOOD PRESSURE: 49 MMHG | TEMPERATURE: 98.7 F

## 2018-05-20 VITALS — TEMPERATURE: 98.1 F | SYSTOLIC BLOOD PRESSURE: 168 MMHG | DIASTOLIC BLOOD PRESSURE: 62 MMHG

## 2018-05-21 ENCOUNTER — HOSPITAL ENCOUNTER (INPATIENT)
Dept: HOSPITAL 67 - MED/SURG | Age: 78
LOS: 10 days | Discharge: HOME | DRG: 556 | End: 2018-05-31
Attending: FAMILY MEDICINE | Admitting: FAMILY MEDICINE
Payer: COMMERCIAL

## 2018-05-21 VITALS — DIASTOLIC BLOOD PRESSURE: 48 MMHG | SYSTOLIC BLOOD PRESSURE: 152 MMHG | TEMPERATURE: 99 F

## 2018-05-21 VITALS
HEIGHT: 63 IN | BODY MASS INDEX: 50.32 KG/M2 | WEIGHT: 284 LBS | BODY MASS INDEX: 50.32 KG/M2 | HEIGHT: 63 IN | WEIGHT: 284 LBS

## 2018-05-21 VITALS — SYSTOLIC BLOOD PRESSURE: 164 MMHG | TEMPERATURE: 98.4 F | DIASTOLIC BLOOD PRESSURE: 50 MMHG

## 2018-05-21 VITALS — TEMPERATURE: 98.4 F | DIASTOLIC BLOOD PRESSURE: 50 MMHG | SYSTOLIC BLOOD PRESSURE: 164 MMHG

## 2018-05-21 VITALS — SYSTOLIC BLOOD PRESSURE: 168 MMHG | TEMPERATURE: 98 F | DIASTOLIC BLOOD PRESSURE: 50 MMHG

## 2018-05-21 DIAGNOSIS — D64.89: ICD-10-CM

## 2018-05-21 DIAGNOSIS — M62.81: Primary | ICD-10-CM

## 2018-05-21 DIAGNOSIS — J44.9: ICD-10-CM

## 2018-05-21 DIAGNOSIS — R26.89: ICD-10-CM

## 2018-05-21 DIAGNOSIS — N17.8: ICD-10-CM

## 2018-05-21 DIAGNOSIS — I89.0: ICD-10-CM

## 2018-05-21 DIAGNOSIS — N18.4: ICD-10-CM

## 2018-05-21 DIAGNOSIS — E66.01: ICD-10-CM

## 2018-05-21 LAB
APTT BLD: 34 SECONDS (ref 24.5–33.6)
POTASSIUM SERPL-SCNC: 5.6 MMOL/L (ref 3.6–5.2)
SODIUM SERPL-SCNC: 136 MMOL/L (ref 136–145)

## 2018-05-21 PROCEDURE — 36415 COLL VENOUS BLD VENIPUNCTURE: CPT

## 2018-05-21 PROCEDURE — 94760 N-INVAS EAR/PLS OXIMETRY 1: CPT

## 2018-05-21 PROCEDURE — 85610 PROTHROMBIN TIME: CPT

## 2018-05-21 PROCEDURE — 80053 COMPREHEN METABOLIC PANEL: CPT

## 2018-05-21 PROCEDURE — 83735 ASSAY OF MAGNESIUM: CPT

## 2018-05-21 PROCEDURE — 85027 COMPLETE CBC AUTOMATED: CPT

## 2018-05-21 NOTE — NUR
IV D/C'D. BLADDER TRAINING IN PROGRESS. PT BEING D/C AS AN IN PATIENT AND
READMITTED AS A SWING BED. D/C INSTRUCTIONS GIVEN. PT HAS NO QUESTIONS.

## 2018-05-22 VITALS — TEMPERATURE: 99.1 F | SYSTOLIC BLOOD PRESSURE: 153 MMHG | DIASTOLIC BLOOD PRESSURE: 42 MMHG

## 2018-05-22 VITALS — DIASTOLIC BLOOD PRESSURE: 59 MMHG | TEMPERATURE: 98.7 F | SYSTOLIC BLOOD PRESSURE: 196 MMHG

## 2018-05-23 VITALS — DIASTOLIC BLOOD PRESSURE: 72 MMHG | SYSTOLIC BLOOD PRESSURE: 180 MMHG | TEMPERATURE: 98.2 F

## 2018-05-24 VITALS — TEMPERATURE: 98.9 F | SYSTOLIC BLOOD PRESSURE: 179 MMHG | DIASTOLIC BLOOD PRESSURE: 53 MMHG

## 2018-05-24 VITALS — DIASTOLIC BLOOD PRESSURE: 60 MMHG | TEMPERATURE: 98.3 F | SYSTOLIC BLOOD PRESSURE: 171 MMHG

## 2018-05-25 VITALS — SYSTOLIC BLOOD PRESSURE: 196 MMHG | DIASTOLIC BLOOD PRESSURE: 100 MMHG | TEMPERATURE: 98.3 F

## 2018-05-25 VITALS — SYSTOLIC BLOOD PRESSURE: 173 MMHG | TEMPERATURE: 98.7 F | DIASTOLIC BLOOD PRESSURE: 50 MMHG

## 2018-05-26 VITALS — TEMPERATURE: 98.3 F | SYSTOLIC BLOOD PRESSURE: 151 MMHG | DIASTOLIC BLOOD PRESSURE: 52 MMHG

## 2018-05-26 VITALS — DIASTOLIC BLOOD PRESSURE: 72 MMHG | TEMPERATURE: 98.6 F | SYSTOLIC BLOOD PRESSURE: 185 MMHG

## 2018-05-28 VITALS — TEMPERATURE: 98.3 F | DIASTOLIC BLOOD PRESSURE: 65 MMHG | SYSTOLIC BLOOD PRESSURE: 117 MMHG

## 2018-05-28 VITALS — TEMPERATURE: 98.6 F | SYSTOLIC BLOOD PRESSURE: 152 MMHG | DIASTOLIC BLOOD PRESSURE: 54 MMHG

## 2018-05-28 LAB
PLATELET # BLD: 178 K/UL (ref 152–353)
POTASSIUM SERPL-SCNC: 5.1 MMOL/L (ref 3.6–5.2)
SODIUM SERPL-SCNC: 137 MMOL/L (ref 136–145)

## 2018-05-29 VITALS — DIASTOLIC BLOOD PRESSURE: 66 MMHG | TEMPERATURE: 98.9 F | SYSTOLIC BLOOD PRESSURE: 126 MMHG

## 2018-05-29 VITALS — SYSTOLIC BLOOD PRESSURE: 188 MMHG | DIASTOLIC BLOOD PRESSURE: 72 MMHG | TEMPERATURE: 98.6 F

## 2018-05-29 LAB — PLATELET # BLD: 162 K/UL (ref 152–353)

## 2018-05-30 VITALS — TEMPERATURE: 98.6 F | SYSTOLIC BLOOD PRESSURE: 191 MMHG | DIASTOLIC BLOOD PRESSURE: 59 MMHG

## 2018-05-30 VITALS — TEMPERATURE: 98.6 F | DIASTOLIC BLOOD PRESSURE: 63 MMHG | SYSTOLIC BLOOD PRESSURE: 196 MMHG

## 2018-05-31 VITALS — DIASTOLIC BLOOD PRESSURE: 52 MMHG | TEMPERATURE: 98 F | SYSTOLIC BLOOD PRESSURE: 179 MMHG

## 2018-06-05 ENCOUNTER — HOSPITAL ENCOUNTER (OUTPATIENT)
Dept: HOSPITAL 67 - LAB | Age: 78
Discharge: HOME | End: 2018-06-05
Attending: FAMILY MEDICINE
Payer: COMMERCIAL

## 2018-06-05 DIAGNOSIS — I12.9: Primary | ICD-10-CM

## 2018-06-05 DIAGNOSIS — I50.9: ICD-10-CM

## 2018-06-05 DIAGNOSIS — N18.2: ICD-10-CM

## 2018-06-05 LAB
PLATELET # BLD: 163 K/UL (ref 152–353)
POTASSIUM SERPL-SCNC: 4.4 MMOL/L (ref 3.6–5.2)
SODIUM SERPL-SCNC: 136 MMOL/L (ref 136–145)

## 2018-06-05 PROCEDURE — 85027 COMPLETE CBC AUTOMATED: CPT

## 2018-06-05 PROCEDURE — 80053 COMPREHEN METABOLIC PANEL: CPT

## 2018-06-05 PROCEDURE — 83735 ASSAY OF MAGNESIUM: CPT

## 2018-08-02 ENCOUNTER — HOSPITAL ENCOUNTER (OUTPATIENT)
Dept: HOSPITAL 67 - LAB | Age: 78
Discharge: HOME | End: 2018-08-02
Attending: FAMILY MEDICINE
Payer: COMMERCIAL

## 2018-08-02 DIAGNOSIS — N18.4: ICD-10-CM

## 2018-08-02 DIAGNOSIS — I12.9: Primary | ICD-10-CM

## 2018-08-02 DIAGNOSIS — I25.10: ICD-10-CM

## 2018-08-02 LAB
POTASSIUM SERPL-SCNC: 4.2 MMOL/L (ref 3.6–5.2)
SODIUM SERPL-SCNC: 142 MMOL/L (ref 136–145)

## 2018-08-02 PROCEDURE — 83735 ASSAY OF MAGNESIUM: CPT

## 2018-08-02 PROCEDURE — 80048 BASIC METABOLIC PNL TOTAL CA: CPT

## 2018-08-02 PROCEDURE — 36415 COLL VENOUS BLD VENIPUNCTURE: CPT

## 2018-09-10 ENCOUNTER — HOSPITAL ENCOUNTER (OUTPATIENT)
Dept: HOSPITAL 67 - ED | Age: 78
Setting detail: OBSERVATION
LOS: 2 days | Discharge: HOME | End: 2018-09-12
Attending: FAMILY MEDICINE | Admitting: FAMILY MEDICINE
Payer: COMMERCIAL

## 2018-09-10 ENCOUNTER — HOSPITAL ENCOUNTER (OUTPATIENT)
Dept: HOSPITAL 67 - AMB | Age: 78
Discharge: ANOTHER HOSPITAL | End: 2018-09-10
Payer: COMMERCIAL

## 2018-09-10 VITALS — TEMPERATURE: 97.4 F | SYSTOLIC BLOOD PRESSURE: 162 MMHG | DIASTOLIC BLOOD PRESSURE: 80 MMHG

## 2018-09-10 VITALS — TEMPERATURE: 97.4 F | DIASTOLIC BLOOD PRESSURE: 71 MMHG | SYSTOLIC BLOOD PRESSURE: 183 MMHG

## 2018-09-10 VITALS — TEMPERATURE: 97.7 F | DIASTOLIC BLOOD PRESSURE: 60 MMHG | SYSTOLIC BLOOD PRESSURE: 174 MMHG

## 2018-09-10 VITALS — DIASTOLIC BLOOD PRESSURE: 62 MMHG | SYSTOLIC BLOOD PRESSURE: 183 MMHG | TEMPERATURE: 98 F

## 2018-09-10 VITALS — SYSTOLIC BLOOD PRESSURE: 170 MMHG | TEMPERATURE: 97.4 F | DIASTOLIC BLOOD PRESSURE: 62 MMHG

## 2018-09-10 VITALS — SYSTOLIC BLOOD PRESSURE: 181 MMHG | TEMPERATURE: 97.6 F | DIASTOLIC BLOOD PRESSURE: 62 MMHG

## 2018-09-10 VITALS
HEIGHT: 63 IN | BODY MASS INDEX: 48.95 KG/M2 | WEIGHT: 276.25 LBS | HEIGHT: 63 IN | BODY MASS INDEX: 48.95 KG/M2 | WEIGHT: 276.25 LBS

## 2018-09-10 VITALS — SYSTOLIC BLOOD PRESSURE: 204 MMHG | DIASTOLIC BLOOD PRESSURE: 75 MMHG | TEMPERATURE: 98.2 F

## 2018-09-10 DIAGNOSIS — E11.42: ICD-10-CM

## 2018-09-10 DIAGNOSIS — Z79.01: ICD-10-CM

## 2018-09-10 DIAGNOSIS — N18.5: ICD-10-CM

## 2018-09-10 DIAGNOSIS — M41.80: ICD-10-CM

## 2018-09-10 DIAGNOSIS — I48.91: ICD-10-CM

## 2018-09-10 DIAGNOSIS — R06.02: Primary | ICD-10-CM

## 2018-09-10 DIAGNOSIS — R19.7: ICD-10-CM

## 2018-09-10 DIAGNOSIS — E11.22: ICD-10-CM

## 2018-09-10 DIAGNOSIS — I89.0: ICD-10-CM

## 2018-09-10 DIAGNOSIS — D64.89: Primary | ICD-10-CM

## 2018-09-10 DIAGNOSIS — R73.9: ICD-10-CM

## 2018-09-10 DIAGNOSIS — E03.8: ICD-10-CM

## 2018-09-10 DIAGNOSIS — I13.2: ICD-10-CM

## 2018-09-10 LAB
APTT BLD: 21.9 SECONDS (ref 24.5–33.6)
PLATELET # BLD: 155 K/UL (ref 152–353)
POTASSIUM SERPL-SCNC: 3.9 MMOL/L (ref 3.6–5.2)
SODIUM SERPL-SCNC: 141 MMOL/L (ref 136–145)

## 2018-09-10 PROCEDURE — 84484 ASSAY OF TROPONIN QUANT: CPT

## 2018-09-10 PROCEDURE — 83880 ASSAY OF NATRIURETIC PEPTIDE: CPT

## 2018-09-10 PROCEDURE — 36430 TRANSFUSION BLD/BLD COMPNT: CPT

## 2018-09-10 PROCEDURE — 02HV33Z INSERTION OF INFUSION DEVICE INTO SUPERIOR VENA CAVA, PERCUTANEOUS APPROACH: ICD-10-PCS

## 2018-09-10 PROCEDURE — 80053 COMPREHEN METABOLIC PANEL: CPT

## 2018-09-10 PROCEDURE — 85027 COMPLETE CBC AUTOMATED: CPT

## 2018-09-10 PROCEDURE — A0427 ALS1-EMERGENCY: HCPCS

## 2018-09-10 PROCEDURE — G0378 HOSPITAL OBSERVATION PER HR: HCPCS

## 2018-09-10 PROCEDURE — 93005 ELECTROCARDIOGRAM TRACING: CPT

## 2018-09-10 PROCEDURE — 99220: CPT

## 2018-09-10 PROCEDURE — C1751 CATH, INF, PER/CENT/MIDLINE: HCPCS

## 2018-09-10 PROCEDURE — P9016 RBC LEUKOCYTES REDUCED: HCPCS

## 2018-09-10 PROCEDURE — 85610 PROTHROMBIN TIME: CPT

## 2018-09-10 PROCEDURE — 86900 BLOOD TYPING SEROLOGIC ABO: CPT

## 2018-09-10 PROCEDURE — 80202 ASSAY OF VANCOMYCIN: CPT

## 2018-09-10 PROCEDURE — 82550 ASSAY OF CK (CPK): CPT

## 2018-09-10 PROCEDURE — A0425 GROUND MILEAGE: HCPCS

## 2018-09-10 PROCEDURE — 82948 REAGENT STRIP/BLOOD GLUCOSE: CPT

## 2018-09-10 PROCEDURE — 96374 THER/PROPH/DIAG INJ IV PUSH: CPT

## 2018-09-10 PROCEDURE — 86850 RBC ANTIBODY SCREEN: CPT

## 2018-09-10 PROCEDURE — 86922 COMPATIBILITY TEST ANTIGLOB: CPT

## 2018-09-10 PROCEDURE — 85730 THROMBOPLASTIN TIME PARTIAL: CPT

## 2018-09-10 PROCEDURE — 36571 INSERT PICVAD CATH: CPT

## 2018-09-10 PROCEDURE — 96375 TX/PRO/DX INJ NEW DRUG ADDON: CPT

## 2018-09-10 PROCEDURE — 86901 BLOOD TYPING SEROLOGIC RH(D): CPT

## 2018-09-10 PROCEDURE — 99283 EMERGENCY DEPT VISIT LOW MDM: CPT

## 2018-09-10 PROCEDURE — 36415 COLL VENOUS BLD VENIPUNCTURE: CPT

## 2018-09-11 VITALS — SYSTOLIC BLOOD PRESSURE: 193 MMHG | DIASTOLIC BLOOD PRESSURE: 64 MMHG | TEMPERATURE: 98.2 F

## 2018-09-11 VITALS — SYSTOLIC BLOOD PRESSURE: 168 MMHG | DIASTOLIC BLOOD PRESSURE: 88 MMHG | TEMPERATURE: 98.1 F

## 2018-09-11 VITALS — SYSTOLIC BLOOD PRESSURE: 160 MMHG | TEMPERATURE: 98.2 F | DIASTOLIC BLOOD PRESSURE: 74 MMHG

## 2018-09-11 VITALS — DIASTOLIC BLOOD PRESSURE: 67 MMHG | TEMPERATURE: 98.8 F | SYSTOLIC BLOOD PRESSURE: 190 MMHG

## 2018-09-11 VITALS — DIASTOLIC BLOOD PRESSURE: 78 MMHG | SYSTOLIC BLOOD PRESSURE: 168 MMHG | TEMPERATURE: 98.1 F

## 2018-09-11 LAB
PLATELET # BLD: 150 K/UL (ref 152–353)
PLATELET # BLD: 155 K/UL (ref 152–353)
POTASSIUM SERPL-SCNC: 4.2 MMOL/L (ref 3.6–5.2)
SODIUM SERPL-SCNC: 141 MMOL/L (ref 136–145)

## 2018-09-11 PROCEDURE — 30243N1 TRANSFUSION OF NONAUTOLOGOUS RED BLOOD CELLS INTO CENTRAL VEIN, PERCUTANEOUS APPROACH: ICD-10-PCS | Performed by: FAMILY MEDICINE

## 2018-10-30 ENCOUNTER — HOSPITAL ENCOUNTER (EMERGENCY)
Dept: HOSPITAL 67 - ED | Age: 78
Discharge: HOME | End: 2018-10-30
Payer: COMMERCIAL

## 2018-10-30 ENCOUNTER — HOSPITAL ENCOUNTER (OUTPATIENT)
Dept: HOSPITAL 67 - AMB | Age: 78
Discharge: ANOTHER HOSPITAL | End: 2018-10-30
Payer: COMMERCIAL

## 2018-10-30 VITALS — WEIGHT: 276 LBS | HEIGHT: 63 IN | TEMPERATURE: 97.9 F | BODY MASS INDEX: 48.9 KG/M2

## 2018-10-30 VITALS — DIASTOLIC BLOOD PRESSURE: 89 MMHG | SYSTOLIC BLOOD PRESSURE: 187 MMHG

## 2018-10-30 DIAGNOSIS — M10.9: Primary | ICD-10-CM

## 2018-10-30 DIAGNOSIS — L89.621: ICD-10-CM

## 2018-10-30 DIAGNOSIS — L97.428: ICD-10-CM

## 2018-10-30 DIAGNOSIS — N28.9: ICD-10-CM

## 2018-10-30 DIAGNOSIS — M79.605: Primary | ICD-10-CM

## 2018-10-30 LAB
PLATELET # BLD: 160 K/UL (ref 152–353)
POTASSIUM SERPL-SCNC: 4.4 MMOL/L (ref 3.6–5.2)
SODIUM SERPL-SCNC: 140 MMOL/L (ref 136–145)

## 2018-10-30 PROCEDURE — 80053 COMPREHEN METABOLIC PANEL: CPT

## 2018-10-30 PROCEDURE — 84550 ASSAY OF BLOOD/URIC ACID: CPT

## 2018-10-30 PROCEDURE — A0425 GROUND MILEAGE: HCPCS

## 2018-10-30 PROCEDURE — A0429 BLS-EMERGENCY: HCPCS

## 2018-10-30 PROCEDURE — 36415 COLL VENOUS BLD VENIPUNCTURE: CPT

## 2018-10-30 PROCEDURE — 85027 COMPLETE CBC AUTOMATED: CPT

## 2018-10-30 PROCEDURE — 99283 EMERGENCY DEPT VISIT LOW MDM: CPT

## 2018-10-31 ENCOUNTER — HOSPITAL ENCOUNTER (OUTPATIENT)
Dept: HOSPITAL 67 - AMB | Age: 78
Discharge: ANOTHER HOSPITAL | End: 2018-10-31
Payer: COMMERCIAL

## 2018-10-31 ENCOUNTER — HOSPITAL ENCOUNTER (INPATIENT)
Dept: HOSPITAL 67 - ED | Age: 78
LOS: 5 days | Discharge: ANOTHER HOSPITAL | DRG: 291 | End: 2018-11-05
Attending: FAMILY MEDICINE | Admitting: FAMILY MEDICINE
Payer: COMMERCIAL

## 2018-10-31 VITALS — SYSTOLIC BLOOD PRESSURE: 140 MMHG | DIASTOLIC BLOOD PRESSURE: 80 MMHG

## 2018-10-31 VITALS
DIASTOLIC BLOOD PRESSURE: 74 MMHG | WEIGHT: 274.38 LBS | WEIGHT: 274.38 LBS | BODY MASS INDEX: 48.62 KG/M2 | HEIGHT: 63 IN | SYSTOLIC BLOOD PRESSURE: 113 MMHG | TEMPERATURE: 98.2 F | HEIGHT: 63 IN | BODY MASS INDEX: 48.62 KG/M2

## 2018-10-31 VITALS — DIASTOLIC BLOOD PRESSURE: 84 MMHG | SYSTOLIC BLOOD PRESSURE: 128 MMHG

## 2018-10-31 DIAGNOSIS — J96.00: ICD-10-CM

## 2018-10-31 DIAGNOSIS — I13.0: Primary | ICD-10-CM

## 2018-10-31 DIAGNOSIS — L03.116: ICD-10-CM

## 2018-10-31 DIAGNOSIS — E11.22: ICD-10-CM

## 2018-10-31 DIAGNOSIS — M41.9: ICD-10-CM

## 2018-10-31 DIAGNOSIS — E03.8: ICD-10-CM

## 2018-10-31 DIAGNOSIS — L89.621: ICD-10-CM

## 2018-10-31 DIAGNOSIS — Z79.01: ICD-10-CM

## 2018-10-31 DIAGNOSIS — N18.4: ICD-10-CM

## 2018-10-31 DIAGNOSIS — I50.1: ICD-10-CM

## 2018-10-31 DIAGNOSIS — B96.89: ICD-10-CM

## 2018-10-31 DIAGNOSIS — J11.1: Primary | ICD-10-CM

## 2018-10-31 DIAGNOSIS — E66.01: ICD-10-CM

## 2018-10-31 DIAGNOSIS — R06.03: ICD-10-CM

## 2018-10-31 DIAGNOSIS — R60.0: ICD-10-CM

## 2018-10-31 DIAGNOSIS — E11.42: ICD-10-CM

## 2018-10-31 DIAGNOSIS — J44.9: ICD-10-CM

## 2018-10-31 DIAGNOSIS — I42.8: ICD-10-CM

## 2018-10-31 DIAGNOSIS — D50.8: ICD-10-CM

## 2018-10-31 LAB
PLATELET # BLD: 169 K/UL (ref 152–353)
POTASSIUM SERPL-SCNC: 4.3 MMOL/L (ref 3.6–5.2)
SODIUM SERPL-SCNC: 140 MMOL/L (ref 136–145)

## 2018-10-31 PROCEDURE — 94760 N-INVAS EAR/PLS OXIMETRY 1: CPT

## 2018-10-31 PROCEDURE — 85730 THROMBOPLASTIN TIME PARTIAL: CPT

## 2018-10-31 PROCEDURE — 94640 AIRWAY INHALATION TREATMENT: CPT

## 2018-10-31 PROCEDURE — 86922 COMPATIBILITY TEST ANTIGLOB: CPT

## 2018-10-31 PROCEDURE — 85610 PROTHROMBIN TIME: CPT

## 2018-10-31 PROCEDURE — 85014 HEMATOCRIT: CPT

## 2018-10-31 PROCEDURE — 86900 BLOOD TYPING SEROLOGIC ABO: CPT

## 2018-10-31 PROCEDURE — 82805 BLOOD GASES W/O2 SATURATION: CPT

## 2018-10-31 PROCEDURE — 87205 SMEAR GRAM STAIN: CPT

## 2018-10-31 PROCEDURE — P9016 RBC LEUKOCYTES REDUCED: HCPCS

## 2018-10-31 PROCEDURE — A0429 BLS-EMERGENCY: HCPCS

## 2018-10-31 PROCEDURE — 80053 COMPREHEN METABOLIC PANEL: CPT

## 2018-10-31 PROCEDURE — 94664 DEMO&/EVAL PT USE INHALER: CPT

## 2018-10-31 PROCEDURE — 99283 EMERGENCY DEPT VISIT LOW MDM: CPT

## 2018-10-31 PROCEDURE — 81000 URINALYSIS NONAUTO W/SCOPE: CPT

## 2018-10-31 PROCEDURE — 83880 ASSAY OF NATRIURETIC PEPTIDE: CPT

## 2018-10-31 PROCEDURE — 85027 COMPLETE CBC AUTOMATED: CPT

## 2018-10-31 PROCEDURE — 30253N1: ICD-10-PCS | Performed by: FAMILY MEDICINE

## 2018-10-31 PROCEDURE — 87077 CULTURE AEROBIC IDENTIFY: CPT

## 2018-10-31 PROCEDURE — A0425 GROUND MILEAGE: HCPCS

## 2018-10-31 PROCEDURE — 86901 BLOOD TYPING SEROLOGIC RH(D): CPT

## 2018-10-31 PROCEDURE — 87502 INFLUENZA DNA AMP PROBE: CPT

## 2018-10-31 PROCEDURE — 87088 URINE BACTERIA CULTURE: CPT

## 2018-10-31 PROCEDURE — 87070 CULTURE OTHR SPECIMN AEROBIC: CPT

## 2018-10-31 PROCEDURE — 36600 WITHDRAWAL OF ARTERIAL BLOOD: CPT

## 2018-10-31 PROCEDURE — 86850 RBC ANTIBODY SCREEN: CPT

## 2018-10-31 PROCEDURE — 36415 COLL VENOUS BLD VENIPUNCTURE: CPT

## 2018-10-31 PROCEDURE — 87186 SC STD MICRODIL/AGAR DIL: CPT

## 2018-10-31 PROCEDURE — 87185 SC STD ENZYME DETCJ PER NZM: CPT

## 2018-10-31 PROCEDURE — 87040 BLOOD CULTURE FOR BACTERIA: CPT

## 2018-10-31 PROCEDURE — 87651 STREP A DNA AMP PROBE: CPT

## 2018-10-31 PROCEDURE — 85018 HEMOGLOBIN: CPT

## 2018-11-01 VITALS — DIASTOLIC BLOOD PRESSURE: 65 MMHG | SYSTOLIC BLOOD PRESSURE: 191 MMHG | TEMPERATURE: 99.7 F

## 2018-11-01 VITALS — SYSTOLIC BLOOD PRESSURE: 172 MMHG | TEMPERATURE: 98.5 F | DIASTOLIC BLOOD PRESSURE: 56 MMHG

## 2018-11-01 VITALS — DIASTOLIC BLOOD PRESSURE: 66 MMHG | TEMPERATURE: 98.9 F | SYSTOLIC BLOOD PRESSURE: 195 MMHG

## 2018-11-01 VITALS — SYSTOLIC BLOOD PRESSURE: 152 MMHG | TEMPERATURE: 98.1 F | DIASTOLIC BLOOD PRESSURE: 52 MMHG

## 2018-11-01 VITALS — SYSTOLIC BLOOD PRESSURE: 142 MMHG | DIASTOLIC BLOOD PRESSURE: 41 MMHG | TEMPERATURE: 98.5 F

## 2018-11-01 VITALS — SYSTOLIC BLOOD PRESSURE: 186 MMHG | TEMPERATURE: 98.3 F | DIASTOLIC BLOOD PRESSURE: 62 MMHG

## 2018-11-01 VITALS — SYSTOLIC BLOOD PRESSURE: 146 MMHG | DIASTOLIC BLOOD PRESSURE: 62 MMHG | TEMPERATURE: 98.4 F

## 2018-11-02 VITALS — TEMPERATURE: 98.4 F | DIASTOLIC BLOOD PRESSURE: 95 MMHG | SYSTOLIC BLOOD PRESSURE: 114 MMHG

## 2018-11-02 VITALS — DIASTOLIC BLOOD PRESSURE: 50 MMHG | TEMPERATURE: 99.5 F | SYSTOLIC BLOOD PRESSURE: 157 MMHG

## 2018-11-02 VITALS — TEMPERATURE: 98.4 F | SYSTOLIC BLOOD PRESSURE: 186 MMHG | DIASTOLIC BLOOD PRESSURE: 71 MMHG

## 2018-11-02 VITALS — TEMPERATURE: 100.1 F | SYSTOLIC BLOOD PRESSURE: 155 MMHG | DIASTOLIC BLOOD PRESSURE: 59 MMHG

## 2018-11-02 VITALS — TEMPERATURE: 98.6 F | SYSTOLIC BLOOD PRESSURE: 154 MMHG | DIASTOLIC BLOOD PRESSURE: 51 MMHG

## 2018-11-02 VITALS — DIASTOLIC BLOOD PRESSURE: 65 MMHG | TEMPERATURE: 99 F | SYSTOLIC BLOOD PRESSURE: 165 MMHG

## 2018-11-02 VITALS — SYSTOLIC BLOOD PRESSURE: 174 MMHG | TEMPERATURE: 97.9 F | DIASTOLIC BLOOD PRESSURE: 69 MMHG

## 2018-11-03 VITALS — DIASTOLIC BLOOD PRESSURE: 58 MMHG | SYSTOLIC BLOOD PRESSURE: 148 MMHG | TEMPERATURE: 98.9 F

## 2018-11-03 VITALS — SYSTOLIC BLOOD PRESSURE: 91 MMHG | DIASTOLIC BLOOD PRESSURE: 60 MMHG

## 2018-11-03 VITALS — SYSTOLIC BLOOD PRESSURE: 150 MMHG | TEMPERATURE: 98.9 F | DIASTOLIC BLOOD PRESSURE: 63 MMHG

## 2018-11-03 VITALS — TEMPERATURE: 98.9 F | SYSTOLIC BLOOD PRESSURE: 179 MMHG | DIASTOLIC BLOOD PRESSURE: 56 MMHG

## 2018-11-03 VITALS — SYSTOLIC BLOOD PRESSURE: 89 MMHG | DIASTOLIC BLOOD PRESSURE: 64 MMHG | TEMPERATURE: 98.5 F

## 2018-11-03 VITALS — TEMPERATURE: 97.6 F | DIASTOLIC BLOOD PRESSURE: 60 MMHG | SYSTOLIC BLOOD PRESSURE: 107 MMHG

## 2018-11-03 LAB
PLATELET # BLD: 180 K/UL (ref 152–353)
POTASSIUM SERPL-SCNC: 4.5 MMOL/L (ref 3.6–5.2)
SODIUM SERPL-SCNC: 141 MMOL/L (ref 136–145)

## 2018-11-04 VITALS — DIASTOLIC BLOOD PRESSURE: 52 MMHG | SYSTOLIC BLOOD PRESSURE: 154 MMHG | TEMPERATURE: 99.1 F

## 2018-11-04 VITALS — DIASTOLIC BLOOD PRESSURE: 69 MMHG | SYSTOLIC BLOOD PRESSURE: 156 MMHG | TEMPERATURE: 99.4 F

## 2018-11-04 VITALS — TEMPERATURE: 98.3 F | DIASTOLIC BLOOD PRESSURE: 49 MMHG | SYSTOLIC BLOOD PRESSURE: 152 MMHG

## 2018-11-04 VITALS — DIASTOLIC BLOOD PRESSURE: 52 MMHG | SYSTOLIC BLOOD PRESSURE: 168 MMHG | TEMPERATURE: 98 F

## 2018-11-04 VITALS — TEMPERATURE: 102 F | SYSTOLIC BLOOD PRESSURE: 140 MMHG | DIASTOLIC BLOOD PRESSURE: 50 MMHG

## 2018-11-04 VITALS — TEMPERATURE: 98.9 F | SYSTOLIC BLOOD PRESSURE: 160 MMHG | DIASTOLIC BLOOD PRESSURE: 44 MMHG

## 2018-11-05 VITALS — SYSTOLIC BLOOD PRESSURE: 160 MMHG | DIASTOLIC BLOOD PRESSURE: 46 MMHG | TEMPERATURE: 97.9 F

## 2018-11-05 VITALS — SYSTOLIC BLOOD PRESSURE: 148 MMHG | DIASTOLIC BLOOD PRESSURE: 48 MMHG | TEMPERATURE: 98.3 F

## 2018-11-05 VITALS — DIASTOLIC BLOOD PRESSURE: 46 MMHG | SYSTOLIC BLOOD PRESSURE: 154 MMHG | TEMPERATURE: 97.8 F

## 2018-11-05 LAB
APTT BLD: 41.2 SECONDS (ref 24.5–33.6)
PLATELET # BLD: 180 K/UL (ref 152–353)
POTASSIUM SERPL-SCNC: 4.6 MMOL/L (ref 3.6–5.2)
SODIUM SERPL-SCNC: 142 MMOL/L (ref 136–145)

## 2018-11-19 ENCOUNTER — HOSPITAL ENCOUNTER (INPATIENT)
Dept: HOSPITAL 67 - MED/SURG | Age: 78
LOS: 18 days | Discharge: ANOTHER HOSPITAL | DRG: 555 | End: 2018-12-07
Attending: FAMILY MEDICINE | Admitting: FAMILY MEDICINE
Payer: COMMERCIAL

## 2018-11-19 VITALS — WEIGHT: 223.06 LBS | HEIGHT: 63 IN | BODY MASS INDEX: 39.52 KG/M2 | BODY MASS INDEX: 39.52 KG/M2

## 2018-11-19 DIAGNOSIS — N18.6: ICD-10-CM

## 2018-11-19 DIAGNOSIS — N17.9: ICD-10-CM

## 2018-11-19 DIAGNOSIS — Z99.2: ICD-10-CM

## 2018-11-19 DIAGNOSIS — I12.0: ICD-10-CM

## 2018-11-19 DIAGNOSIS — E03.8: ICD-10-CM

## 2018-11-19 DIAGNOSIS — D64.89: ICD-10-CM

## 2018-11-19 DIAGNOSIS — M41.80: ICD-10-CM

## 2018-11-19 DIAGNOSIS — K21.9: ICD-10-CM

## 2018-11-19 DIAGNOSIS — I10: ICD-10-CM

## 2018-11-19 DIAGNOSIS — R62.7: ICD-10-CM

## 2018-11-19 DIAGNOSIS — E11.22: ICD-10-CM

## 2018-11-19 DIAGNOSIS — E78.49: ICD-10-CM

## 2018-11-19 DIAGNOSIS — J44.9: ICD-10-CM

## 2018-11-19 DIAGNOSIS — E11.42: ICD-10-CM

## 2018-11-19 DIAGNOSIS — M62.81: Primary | ICD-10-CM

## 2018-11-19 DIAGNOSIS — I25.10: ICD-10-CM

## 2018-11-19 DIAGNOSIS — G47.30: ICD-10-CM

## 2018-11-19 PROCEDURE — 85027 COMPLETE CBC AUTOMATED: CPT

## 2018-11-19 PROCEDURE — 81000 URINALYSIS NONAUTO W/SCOPE: CPT

## 2018-11-19 PROCEDURE — 83735 ASSAY OF MAGNESIUM: CPT

## 2018-11-19 PROCEDURE — 87186 SC STD MICRODIL/AGAR DIL: CPT

## 2018-11-19 PROCEDURE — 36415 COLL VENOUS BLD VENIPUNCTURE: CPT

## 2018-11-19 PROCEDURE — 94760 N-INVAS EAR/PLS OXIMETRY 1: CPT

## 2018-11-19 PROCEDURE — 87086 URINE CULTURE/COLONY COUNT: CPT

## 2018-11-19 PROCEDURE — 87088 URINE BACTERIA CULTURE: CPT

## 2018-11-19 PROCEDURE — 85610 PROTHROMBIN TIME: CPT

## 2018-11-19 PROCEDURE — 87077 CULTURE AEROBIC IDENTIFY: CPT

## 2018-11-19 PROCEDURE — 80053 COMPREHEN METABOLIC PANEL: CPT

## 2018-11-20 VITALS — DIASTOLIC BLOOD PRESSURE: 60 MMHG | SYSTOLIC BLOOD PRESSURE: 156 MMHG | TEMPERATURE: 98.3 F

## 2018-11-20 VITALS — DIASTOLIC BLOOD PRESSURE: 50 MMHG | TEMPERATURE: 99.52 F | SYSTOLIC BLOOD PRESSURE: 166 MMHG

## 2018-11-20 LAB
PLATELET # BLD: 169 K/UL (ref 152–353)
POTASSIUM SERPL-SCNC: 3.5 MMOL/L (ref 3.6–5.2)
SODIUM SERPL-SCNC: 140 MMOL/L (ref 136–145)

## 2018-11-21 ENCOUNTER — HOSPITAL ENCOUNTER (OUTPATIENT)
Dept: HOSPITAL 67 - AMB | Age: 78
Discharge: HOME | End: 2018-11-21
Payer: COMMERCIAL

## 2018-11-21 VITALS — TEMPERATURE: 98.8 F | SYSTOLIC BLOOD PRESSURE: 155 MMHG | DIASTOLIC BLOOD PRESSURE: 53 MMHG

## 2018-11-21 DIAGNOSIS — Z99.2: Primary | ICD-10-CM

## 2018-11-22 VITALS — DIASTOLIC BLOOD PRESSURE: 75 MMHG | TEMPERATURE: 99.3 F | SYSTOLIC BLOOD PRESSURE: 102 MMHG

## 2018-11-22 VITALS — SYSTOLIC BLOOD PRESSURE: 114 MMHG | DIASTOLIC BLOOD PRESSURE: 51 MMHG | TEMPERATURE: 98.4 F

## 2018-11-23 VITALS — SYSTOLIC BLOOD PRESSURE: 158 MMHG | DIASTOLIC BLOOD PRESSURE: 52 MMHG | TEMPERATURE: 99.2 F

## 2018-11-24 VITALS — DIASTOLIC BLOOD PRESSURE: 54 MMHG | TEMPERATURE: 99.1 F | SYSTOLIC BLOOD PRESSURE: 169 MMHG

## 2018-11-25 VITALS — SYSTOLIC BLOOD PRESSURE: 151 MMHG | DIASTOLIC BLOOD PRESSURE: 50 MMHG | TEMPERATURE: 98.4 F

## 2018-11-26 VITALS — DIASTOLIC BLOOD PRESSURE: 57 MMHG | SYSTOLIC BLOOD PRESSURE: 150 MMHG | TEMPERATURE: 98.1 F

## 2018-11-26 LAB
PLATELET # BLD: 247 K/UL (ref 152–353)
POTASSIUM SERPL-SCNC: 3.3 MMOL/L (ref 3.6–5.2)
SODIUM SERPL-SCNC: 138 MMOL/L (ref 136–145)

## 2018-11-27 VITALS — TEMPERATURE: 98.8 F | DIASTOLIC BLOOD PRESSURE: 55 MMHG | SYSTOLIC BLOOD PRESSURE: 154 MMHG

## 2018-11-27 VITALS — SYSTOLIC BLOOD PRESSURE: 142 MMHG | TEMPERATURE: 97.7 F | DIASTOLIC BLOOD PRESSURE: 52 MMHG

## 2018-11-28 VITALS — SYSTOLIC BLOOD PRESSURE: 137 MMHG | TEMPERATURE: 98.7 F | DIASTOLIC BLOOD PRESSURE: 53 MMHG

## 2018-11-28 VITALS — TEMPERATURE: 98.1 F | DIASTOLIC BLOOD PRESSURE: 52 MMHG | SYSTOLIC BLOOD PRESSURE: 155 MMHG

## 2018-11-29 VITALS — TEMPERATURE: 97.2 F | SYSTOLIC BLOOD PRESSURE: 148 MMHG | DIASTOLIC BLOOD PRESSURE: 53 MMHG

## 2018-11-29 VITALS — TEMPERATURE: 97.7 F | DIASTOLIC BLOOD PRESSURE: 65 MMHG | SYSTOLIC BLOOD PRESSURE: 146 MMHG

## 2018-11-30 VITALS — SYSTOLIC BLOOD PRESSURE: 136 MMHG | DIASTOLIC BLOOD PRESSURE: 88 MMHG | TEMPERATURE: 99.4 F

## 2018-12-01 VITALS — SYSTOLIC BLOOD PRESSURE: 168 MMHG | TEMPERATURE: 99 F | DIASTOLIC BLOOD PRESSURE: 54 MMHG

## 2018-12-01 VITALS — SYSTOLIC BLOOD PRESSURE: 145 MMHG | DIASTOLIC BLOOD PRESSURE: 42 MMHG | TEMPERATURE: 99 F

## 2018-12-02 VITALS — SYSTOLIC BLOOD PRESSURE: 156 MMHG | DIASTOLIC BLOOD PRESSURE: 51 MMHG | TEMPERATURE: 99.5 F

## 2018-12-02 VITALS — SYSTOLIC BLOOD PRESSURE: 163 MMHG | DIASTOLIC BLOOD PRESSURE: 55 MMHG | TEMPERATURE: 98.8 F

## 2018-12-03 VITALS — DIASTOLIC BLOOD PRESSURE: 46 MMHG | TEMPERATURE: 98.1 F | SYSTOLIC BLOOD PRESSURE: 144 MMHG

## 2018-12-03 LAB
PLATELET # BLD: 166 K/UL (ref 152–353)
POTASSIUM SERPL-SCNC: 3.4 MMOL/L (ref 3.6–5.2)
SODIUM SERPL-SCNC: 136 MMOL/L (ref 136–145)

## 2018-12-04 VITALS — DIASTOLIC BLOOD PRESSURE: 53 MMHG | SYSTOLIC BLOOD PRESSURE: 163 MMHG | TEMPERATURE: 99.5 F

## 2018-12-05 VITALS — SYSTOLIC BLOOD PRESSURE: 147 MMHG | DIASTOLIC BLOOD PRESSURE: 45 MMHG | TEMPERATURE: 98.7 F

## 2018-12-05 VITALS — TEMPERATURE: 99.4 F | DIASTOLIC BLOOD PRESSURE: 53 MMHG | SYSTOLIC BLOOD PRESSURE: 146 MMHG

## 2018-12-06 ENCOUNTER — HOSPITAL ENCOUNTER (OUTPATIENT)
Dept: HOSPITAL 67 - LAB | Age: 78
Discharge: HOME | End: 2018-12-06
Attending: INTERNAL MEDICINE
Payer: COMMERCIAL

## 2018-12-06 VITALS — DIASTOLIC BLOOD PRESSURE: 45 MMHG | SYSTOLIC BLOOD PRESSURE: 163 MMHG | TEMPERATURE: 98.7 F

## 2018-12-06 VITALS — DIASTOLIC BLOOD PRESSURE: 55 MMHG | SYSTOLIC BLOOD PRESSURE: 160 MMHG | TEMPERATURE: 98.6 F

## 2018-12-06 DIAGNOSIS — D64.89: Primary | ICD-10-CM

## 2018-12-06 PROCEDURE — 85018 HEMOGLOBIN: CPT

## 2018-12-07 ENCOUNTER — HOSPITAL ENCOUNTER (INPATIENT)
Dept: HOSPITAL 67 - MED/SURG | Age: 78
LOS: 4 days | Discharge: NURSING, REHAB OR LONG-TERM CARE | DRG: 640 | End: 2018-12-11
Attending: FAMILY MEDICINE | Admitting: FAMILY MEDICINE
Payer: COMMERCIAL

## 2018-12-07 VITALS — SYSTOLIC BLOOD PRESSURE: 154 MMHG | TEMPERATURE: 98.3 F | DIASTOLIC BLOOD PRESSURE: 67 MMHG

## 2018-12-07 VITALS — TEMPERATURE: 99 F | SYSTOLIC BLOOD PRESSURE: 180 MMHG | DIASTOLIC BLOOD PRESSURE: 70 MMHG

## 2018-12-07 VITALS — DIASTOLIC BLOOD PRESSURE: 64 MMHG | TEMPERATURE: 98.7 F | SYSTOLIC BLOOD PRESSURE: 184 MMHG

## 2018-12-07 VITALS
BODY MASS INDEX: 39.23 KG/M2 | WEIGHT: 221.44 LBS | BODY MASS INDEX: 39.23 KG/M2 | HEIGHT: 63 IN | HEIGHT: 63 IN | WEIGHT: 221.44 LBS

## 2018-12-07 VITALS — SYSTOLIC BLOOD PRESSURE: 161 MMHG | TEMPERATURE: 99 F | DIASTOLIC BLOOD PRESSURE: 60 MMHG

## 2018-12-07 DIAGNOSIS — N18.6: ICD-10-CM

## 2018-12-07 DIAGNOSIS — J44.9: ICD-10-CM

## 2018-12-07 DIAGNOSIS — E83.42: Primary | ICD-10-CM

## 2018-12-07 DIAGNOSIS — R62.7: ICD-10-CM

## 2018-12-07 DIAGNOSIS — E66.01: ICD-10-CM

## 2018-12-07 DIAGNOSIS — M41.80: ICD-10-CM

## 2018-12-07 DIAGNOSIS — Z99.2: ICD-10-CM

## 2018-12-07 DIAGNOSIS — K21.9: ICD-10-CM

## 2018-12-07 DIAGNOSIS — I12.0: ICD-10-CM

## 2018-12-07 DIAGNOSIS — G47.33: ICD-10-CM

## 2018-12-07 DIAGNOSIS — D64.89: ICD-10-CM

## 2018-12-07 DIAGNOSIS — E03.8: ICD-10-CM

## 2018-12-07 DIAGNOSIS — E11.22: ICD-10-CM

## 2018-12-07 DIAGNOSIS — E87.6: ICD-10-CM

## 2018-12-07 DIAGNOSIS — E11.42: ICD-10-CM

## 2018-12-07 LAB
PLATELET # BLD: 173 K/UL (ref 152–353)
POTASSIUM SERPL-SCNC: 2.6 MMOL/L (ref 3.6–5.2)
SODIUM SERPL-SCNC: 137 MMOL/L (ref 136–145)

## 2018-12-07 PROCEDURE — 80048 BASIC METABOLIC PNL TOTAL CA: CPT

## 2018-12-07 PROCEDURE — 96375 TX/PRO/DX INJ NEW DRUG ADDON: CPT

## 2018-12-07 PROCEDURE — 36415 COLL VENOUS BLD VENIPUNCTURE: CPT

## 2018-12-07 PROCEDURE — 85610 PROTHROMBIN TIME: CPT

## 2018-12-07 PROCEDURE — 94760 N-INVAS EAR/PLS OXIMETRY 1: CPT

## 2018-12-07 PROCEDURE — 83735 ASSAY OF MAGNESIUM: CPT

## 2018-12-07 NOTE — NUR
WRITER AT BEDSIDE TO START IV SITE, PT SEEMS A LITTLE UPSET THAT SHE WILL NEED
AN IV.  STATES SHE WILL ONLY  LET WRITER STICK HER ONCE.  2025 OBTAINED 22G IV
TO R HAND, GOOD BLOOD RETURN NOTED, FLUSHED EASILY WITH 10ML NS, SECURED WITH
TAPE AND TEGADERM, PT TOLERATED WITH NO PROBLEMS.

## 2018-12-07 NOTE — NUR
PT ASKED AGAIN ABOUT LETTING STAFF START A NEW IV SITE, PT REFUSES STILL.  D/C
22G IV FROM R HAND WITH NO PROBLEMS NOTED TO SITE.  2300 PT REPOSITIONED AND
IS NOW WATCHING TV, NO DISTRESS NOTED.

## 2018-12-08 VITALS — DIASTOLIC BLOOD PRESSURE: 68 MMHG | SYSTOLIC BLOOD PRESSURE: 189 MMHG | TEMPERATURE: 98.9 F

## 2018-12-08 VITALS — SYSTOLIC BLOOD PRESSURE: 119 MMHG | DIASTOLIC BLOOD PRESSURE: 65 MMHG | TEMPERATURE: 97.8 F

## 2018-12-08 VITALS — TEMPERATURE: 99.1 F | SYSTOLIC BLOOD PRESSURE: 154 MMHG | DIASTOLIC BLOOD PRESSURE: 73 MMHG

## 2018-12-08 VITALS — TEMPERATURE: 98.5 F | SYSTOLIC BLOOD PRESSURE: 118 MMHG | DIASTOLIC BLOOD PRESSURE: 77 MMHG

## 2018-12-08 VITALS — SYSTOLIC BLOOD PRESSURE: 119 MMHG | TEMPERATURE: 97.8 F | DIASTOLIC BLOOD PRESSURE: 65 MMHG

## 2018-12-08 VITALS — TEMPERATURE: 98.8 F

## 2018-12-08 LAB
POTASSIUM SERPL-SCNC: 3.1 MMOL/L (ref 3.6–5.2)
SODIUM SERPL-SCNC: 139 MMOL/L (ref 136–145)

## 2018-12-09 VITALS — SYSTOLIC BLOOD PRESSURE: 161 MMHG | DIASTOLIC BLOOD PRESSURE: 53 MMHG | TEMPERATURE: 98.8 F

## 2018-12-09 VITALS — DIASTOLIC BLOOD PRESSURE: 54 MMHG | TEMPERATURE: 98.7 F | SYSTOLIC BLOOD PRESSURE: 177 MMHG

## 2018-12-09 VITALS — DIASTOLIC BLOOD PRESSURE: 63 MMHG | TEMPERATURE: 97.2 F | SYSTOLIC BLOOD PRESSURE: 177 MMHG

## 2018-12-09 VITALS — DIASTOLIC BLOOD PRESSURE: 44 MMHG | TEMPERATURE: 99 F | SYSTOLIC BLOOD PRESSURE: 165 MMHG

## 2018-12-09 VITALS — SYSTOLIC BLOOD PRESSURE: 186 MMHG | TEMPERATURE: 99.3 F | DIASTOLIC BLOOD PRESSURE: 55 MMHG

## 2018-12-09 VITALS — SYSTOLIC BLOOD PRESSURE: 180 MMHG | DIASTOLIC BLOOD PRESSURE: 68 MMHG | TEMPERATURE: 99.2 F

## 2018-12-09 VITALS — DIASTOLIC BLOOD PRESSURE: 62 MMHG | SYSTOLIC BLOOD PRESSURE: 176 MMHG | TEMPERATURE: 98.7 F

## 2018-12-09 LAB
POTASSIUM SERPL-SCNC: 4 MMOL/L (ref 3.6–5.2)
SODIUM SERPL-SCNC: 138 MMOL/L (ref 136–145)

## 2018-12-09 NOTE — NUR
DR GORDON HERE AT THIS TIME SEEING PT.  ORDERS TO KEEP PT ON 5MG OF
COUMADIN AND EXPLAINS TO PT THAT K AND MAG LEVELS ARE NOW WNL. PT ASK DR. GORDON ABOUT HAVING TO GO TO NURSING HOME AND TELLS HIM ABOUT HER CONCERNS,
 TELLS PT THAT SHE WILL HAVE TO HAVE A TALK WITH HER DAUGHTER AND PCP (DR BEARD) ABOUT THESE CONCERNS. PT HAS NO FUTHER QUESTIONS AT THIS TIME.

## 2018-12-09 NOTE — NUR
EMS HERE TO HELP PT UP AND INTO CHAIR. PT FIRST ASSISTED TO BSC (PT HAS LARGE
FORMED BM AND VOIDS APPROX 150MLS OF URINE). PT THEN ASSISTED BY 4 STAFF
MEMBERS INTO CHAIR AS DR. JIMENEZ.

## 2018-12-10 VITALS — SYSTOLIC BLOOD PRESSURE: 87 MMHG | TEMPERATURE: 99.6 F | DIASTOLIC BLOOD PRESSURE: 64 MMHG

## 2018-12-10 VITALS — SYSTOLIC BLOOD PRESSURE: 154 MMHG | DIASTOLIC BLOOD PRESSURE: 64 MMHG | TEMPERATURE: 100.1 F

## 2018-12-10 VITALS — TEMPERATURE: 98.6 F | DIASTOLIC BLOOD PRESSURE: 49 MMHG | SYSTOLIC BLOOD PRESSURE: 150 MMHG

## 2018-12-10 VITALS — SYSTOLIC BLOOD PRESSURE: 133 MMHG | TEMPERATURE: 98.8 F | DIASTOLIC BLOOD PRESSURE: 68 MMHG

## 2018-12-10 VITALS — TEMPERATURE: 98.9 F | DIASTOLIC BLOOD PRESSURE: 56 MMHG | SYSTOLIC BLOOD PRESSURE: 161 MMHG

## 2018-12-10 LAB
POTASSIUM SERPL-SCNC: 4.7 MMOL/L (ref 3.6–5.2)
SODIUM SERPL-SCNC: 139 MMOL/L (ref 136–145)

## 2018-12-10 NOTE — NUR
1045 PT GONE TO FACILITY DIALYSIS VIA BED.
1415 PT RETURNED TO ROOM FROM DIALYSIS VIA BED. PT ALERT AND ORIENTED X4.

## 2018-12-11 VITALS — DIASTOLIC BLOOD PRESSURE: 47 MMHG | TEMPERATURE: 99.4 F | SYSTOLIC BLOOD PRESSURE: 173 MMHG

## 2018-12-11 VITALS — SYSTOLIC BLOOD PRESSURE: 170 MMHG | DIASTOLIC BLOOD PRESSURE: 58 MMHG | TEMPERATURE: 99 F

## 2018-12-11 VITALS — DIASTOLIC BLOOD PRESSURE: 61 MMHG | SYSTOLIC BLOOD PRESSURE: 179 MMHG | TEMPERATURE: 98.9 F

## 2018-12-11 NOTE — NUR
DISCHARGE INSTUCTIONS GIVEN TO ACCEPTING NURSING HOME. PATIENT LEFT VIA
[facility] VIA STRETCHER. NO DISTRESS NOTED.

## 2019-02-17 ENCOUNTER — HOSPITAL ENCOUNTER (EMERGENCY)
Dept: HOSPITAL 67 - ED | Age: 79
Discharge: HOME | End: 2019-02-17
Payer: COMMERCIAL

## 2019-02-17 ENCOUNTER — HOSPITAL ENCOUNTER (OUTPATIENT)
Dept: HOSPITAL 67 - AMB | Age: 79
Discharge: ANOTHER HOSPITAL | End: 2019-02-17
Payer: COMMERCIAL

## 2019-02-17 VITALS — WEIGHT: 221 LBS | HEIGHT: 63 IN | BODY MASS INDEX: 39.16 KG/M2

## 2019-02-17 VITALS — TEMPERATURE: 98.6 F

## 2019-02-17 VITALS — SYSTOLIC BLOOD PRESSURE: 124 MMHG | DIASTOLIC BLOOD PRESSURE: 78 MMHG

## 2019-02-17 DIAGNOSIS — M79.671: ICD-10-CM
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
## 2019-02-17 DIAGNOSIS — S40.011A: ICD-10-CM

## 2019-02-17 DIAGNOSIS — M79.672: ICD-10-CM

## 2019-02-17 DIAGNOSIS — S93.692A: Primary | ICD-10-CM

## 2019-02-17 DIAGNOSIS — M25.512: Primary | ICD-10-CM

## 2019-02-17 DIAGNOSIS — S93.691A: ICD-10-CM
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
## 2019-02-17 PROCEDURE — A0425 GROUND MILEAGE: HCPCS

## 2019-02-17 PROCEDURE — 99283 EMERGENCY DEPT VISIT LOW MDM: CPT

## 2019-02-17 PROCEDURE — A0429 BLS-EMERGENCY: HCPCS

## 2019-02-17 PROCEDURE — 87502 INFLUENZA DNA AMP PROBE: CPT

## 2019-03-08 ENCOUNTER — HOSPITAL ENCOUNTER (OUTPATIENT)
Dept: HOSPITAL 67 - AMB | Age: 79
Discharge: ANOTHER HOSPITAL | End: 2019-03-08
Payer: COMMERCIAL

## 2019-03-08 ENCOUNTER — HOSPITAL ENCOUNTER (EMERGENCY)
Dept: HOSPITAL 67 - ED | Age: 79
Discharge: HOME | End: 2019-03-08
Payer: COMMERCIAL

## 2019-03-08 VITALS — SYSTOLIC BLOOD PRESSURE: 127 MMHG | DIASTOLIC BLOOD PRESSURE: 46 MMHG | TEMPERATURE: 98 F

## 2019-03-08 VITALS — WEIGHT: 221 LBS | HEIGHT: 63 IN | BODY MASS INDEX: 39.16 KG/M2

## 2019-03-08 DIAGNOSIS — R07.89: Primary | ICD-10-CM

## 2019-03-08 DIAGNOSIS — Z99.2: ICD-10-CM

## 2019-03-08 LAB
PLATELET # BLD: 253 K/UL (ref 152–353)
POTASSIUM SERPL-SCNC: 3.2 MMOL/L (ref 3.6–5.2)
SODIUM SERPL-SCNC: 134 MMOL/L (ref 136–145)

## 2019-03-08 PROCEDURE — 36415 COLL VENOUS BLD VENIPUNCTURE: CPT

## 2019-03-08 PROCEDURE — 82553 CREATINE MB FRACTION: CPT

## 2019-03-08 PROCEDURE — A0429 BLS-EMERGENCY: HCPCS

## 2019-03-08 PROCEDURE — A0425 GROUND MILEAGE: HCPCS

## 2019-03-08 PROCEDURE — 85027 COMPLETE CBC AUTOMATED: CPT

## 2019-03-08 PROCEDURE — 84484 ASSAY OF TROPONIN QUANT: CPT

## 2019-03-08 PROCEDURE — 80053 COMPREHEN METABOLIC PANEL: CPT

## 2019-03-08 PROCEDURE — 93005 ELECTROCARDIOGRAM TRACING: CPT

## 2019-03-08 PROCEDURE — 99284 EMERGENCY DEPT VISIT MOD MDM: CPT

## 2019-03-08 PROCEDURE — 82550 ASSAY OF CK (CPK): CPT

## 2019-04-13 ENCOUNTER — HOSPITAL ENCOUNTER (OUTPATIENT)
Dept: HOSPITAL 67 - AMB | Age: 79
Discharge: ANOTHER HOSPITAL | End: 2019-04-13
Payer: COMMERCIAL

## 2019-04-13 ENCOUNTER — HOSPITAL ENCOUNTER (EMERGENCY)
Dept: HOSPITAL 67 - ED | Age: 79
Discharge: HOME | End: 2019-04-13
Payer: COMMERCIAL

## 2019-04-13 VITALS — TEMPERATURE: 98 F | DIASTOLIC BLOOD PRESSURE: 47 MMHG | SYSTOLIC BLOOD PRESSURE: 116 MMHG

## 2019-04-13 VITALS — HEIGHT: 63 IN | WEIGHT: 198 LBS | BODY MASS INDEX: 35.08 KG/M2

## 2019-04-13 DIAGNOSIS — R10.84: Primary | ICD-10-CM

## 2019-04-13 DIAGNOSIS — R11.2: ICD-10-CM

## 2019-04-13 LAB
PLATELET # BLD: 131 K/UL (ref 152–353)
POTASSIUM SERPL-SCNC: 3.8 MMOL/L (ref 3.6–5.2)
SODIUM SERPL-SCNC: 135 MMOL/L (ref 136–145)

## 2019-04-13 PROCEDURE — 96372 THER/PROPH/DIAG INJ SC/IM: CPT

## 2019-04-13 PROCEDURE — 74022 RADEX COMPL AQT ABD SERIES: CPT

## 2019-04-13 PROCEDURE — 99283 EMERGENCY DEPT VISIT LOW MDM: CPT

## 2019-04-13 PROCEDURE — 85027 COMPLETE CBC AUTOMATED: CPT

## 2019-04-13 PROCEDURE — A0425 GROUND MILEAGE: HCPCS

## 2019-04-13 PROCEDURE — 83690 ASSAY OF LIPASE: CPT

## 2019-04-13 PROCEDURE — A0429 BLS-EMERGENCY: HCPCS

## 2019-04-13 PROCEDURE — 82150 ASSAY OF AMYLASE: CPT

## 2019-04-13 PROCEDURE — 80053 COMPREHEN METABOLIC PANEL: CPT

## 2019-06-05 ENCOUNTER — HOSPITAL ENCOUNTER (OUTPATIENT)
Dept: HOSPITAL 67 - LAB | Age: 79
Discharge: HOME | End: 2019-06-05
Attending: INTERNAL MEDICINE
Payer: COMMERCIAL

## 2019-06-05 DIAGNOSIS — I48.0: Primary | ICD-10-CM

## 2019-06-05 PROCEDURE — 85610 PROTHROMBIN TIME: CPT

## 2019-09-09 ENCOUNTER — HOSPITAL ENCOUNTER (EMERGENCY)
Dept: HOSPITAL 67 - ED | Age: 79
Discharge: HOME | End: 2019-09-09
Payer: COMMERCIAL

## 2019-09-09 ENCOUNTER — HOSPITAL ENCOUNTER (OUTPATIENT)
Dept: HOSPITAL 67 - AMB | Age: 79
Discharge: ANOTHER HOSPITAL | End: 2019-09-09
Payer: COMMERCIAL

## 2019-09-09 VITALS — WEIGHT: 214 LBS | BODY MASS INDEX: 37.92 KG/M2 | HEIGHT: 63 IN

## 2019-09-09 VITALS — DIASTOLIC BLOOD PRESSURE: 58 MMHG | TEMPERATURE: 98.1 F | SYSTOLIC BLOOD PRESSURE: 160 MMHG

## 2019-09-09 DIAGNOSIS — R53.1: ICD-10-CM

## 2019-09-09 DIAGNOSIS — R11.0: Primary | ICD-10-CM

## 2019-09-09 DIAGNOSIS — R07.89: Primary | ICD-10-CM

## 2019-09-09 LAB
PLATELET # BLD: 173 K/UL (ref 152–353)
POTASSIUM SERPL-SCNC: 3.9 MMOL/L (ref 3.6–5.2)
SODIUM SERPL-SCNC: 136 MMOL/L (ref 136–145)

## 2019-09-09 PROCEDURE — 85027 COMPLETE CBC AUTOMATED: CPT

## 2019-09-09 PROCEDURE — 99283 EMERGENCY DEPT VISIT LOW MDM: CPT

## 2019-09-09 PROCEDURE — 83690 ASSAY OF LIPASE: CPT

## 2019-09-09 PROCEDURE — 82550 ASSAY OF CK (CPK): CPT

## 2019-09-09 PROCEDURE — 93005 ELECTROCARDIOGRAM TRACING: CPT

## 2019-09-09 PROCEDURE — 80053 COMPREHEN METABOLIC PANEL: CPT

## 2019-09-09 PROCEDURE — 82150 ASSAY OF AMYLASE: CPT

## 2019-09-09 PROCEDURE — 36415 COLL VENOUS BLD VENIPUNCTURE: CPT

## 2019-09-09 PROCEDURE — 84484 ASSAY OF TROPONIN QUANT: CPT

## 2019-09-09 PROCEDURE — A0429 BLS-EMERGENCY: HCPCS

## 2019-09-09 PROCEDURE — A0425 GROUND MILEAGE: HCPCS

## 2019-09-18 ENCOUNTER — HOSPITAL ENCOUNTER (EMERGENCY)
Dept: HOSPITAL 67 - ED | Age: 79
Discharge: HOME | End: 2019-09-18
Payer: COMMERCIAL

## 2019-09-18 VITALS — DIASTOLIC BLOOD PRESSURE: 59 MMHG | SYSTOLIC BLOOD PRESSURE: 148 MMHG | TEMPERATURE: 98.2 F

## 2019-09-18 VITALS — HEIGHT: 63 IN | BODY MASS INDEX: 37.92 KG/M2 | WEIGHT: 214 LBS

## 2019-09-18 DIAGNOSIS — K59.09: ICD-10-CM

## 2019-09-18 DIAGNOSIS — R10.84: Primary | ICD-10-CM

## 2019-09-18 LAB
PLATELET # BLD: 151 K/UL (ref 152–353)
POTASSIUM SERPL-SCNC: 3.1 MMOL/L (ref 3.6–5.2)
SODIUM SERPL-SCNC: 140 MMOL/L (ref 136–145)

## 2019-09-18 PROCEDURE — 36415 COLL VENOUS BLD VENIPUNCTURE: CPT

## 2019-09-18 PROCEDURE — 82150 ASSAY OF AMYLASE: CPT

## 2019-09-18 PROCEDURE — 80053 COMPREHEN METABOLIC PANEL: CPT

## 2019-09-18 PROCEDURE — 99283 EMERGENCY DEPT VISIT LOW MDM: CPT

## 2019-09-18 PROCEDURE — 83690 ASSAY OF LIPASE: CPT

## 2019-09-18 PROCEDURE — 85027 COMPLETE CBC AUTOMATED: CPT

## 2019-09-19 ENCOUNTER — HOSPITAL ENCOUNTER (OUTPATIENT)
Dept: HOSPITAL 67 - MED/SURG | Age: 79
Setting detail: OBSERVATION
LOS: 1 days | Discharge: HOME | End: 2019-09-20
Attending: FAMILY MEDICINE | Admitting: FAMILY MEDICINE
Payer: COMMERCIAL

## 2019-09-19 VITALS — TEMPERATURE: 98.2 F | DIASTOLIC BLOOD PRESSURE: 56 MMHG | SYSTOLIC BLOOD PRESSURE: 160 MMHG

## 2019-09-19 VITALS — WEIGHT: 214 LBS | HEIGHT: 63 IN | BODY MASS INDEX: 37.92 KG/M2

## 2019-09-19 VITALS — TEMPERATURE: 98 F | SYSTOLIC BLOOD PRESSURE: 135 MMHG | DIASTOLIC BLOOD PRESSURE: 58 MMHG

## 2019-09-19 DIAGNOSIS — K56.41: Primary | ICD-10-CM

## 2019-09-19 DIAGNOSIS — I12.0: ICD-10-CM

## 2019-09-19 DIAGNOSIS — E11.22: ICD-10-CM

## 2019-09-19 DIAGNOSIS — N18.6: ICD-10-CM

## 2019-09-19 DIAGNOSIS — M41.80: ICD-10-CM

## 2019-09-19 PROCEDURE — 99220: CPT

## 2019-09-19 PROCEDURE — G0379 DIRECT REFER HOSPITAL OBSERV: HCPCS

## 2019-09-19 PROCEDURE — G0378 HOSPITAL OBSERVATION PER HR: HCPCS

## 2019-09-20 VITALS — TEMPERATURE: 99.4 F | DIASTOLIC BLOOD PRESSURE: 49 MMHG | SYSTOLIC BLOOD PRESSURE: 130 MMHG

## 2019-09-20 VITALS — TEMPERATURE: 98 F | SYSTOLIC BLOOD PRESSURE: 142 MMHG | DIASTOLIC BLOOD PRESSURE: 49 MMHG

## 2019-09-20 VITALS — TEMPERATURE: 98.7 F | SYSTOLIC BLOOD PRESSURE: 145 MMHG | DIASTOLIC BLOOD PRESSURE: 54 MMHG

## 2019-09-20 VITALS — TEMPERATURE: 98.4 F | DIASTOLIC BLOOD PRESSURE: 53 MMHG | SYSTOLIC BLOOD PRESSURE: 151 MMHG

## 2019-10-04 ENCOUNTER — HOSPITAL ENCOUNTER (EMERGENCY)
Dept: HOSPITAL 67 - ED | Age: 79
LOS: 1 days | Discharge: HOME | End: 2019-10-05
Payer: COMMERCIAL

## 2019-10-04 VITALS — BODY MASS INDEX: 37.92 KG/M2 | HEIGHT: 63 IN | WEIGHT: 214 LBS

## 2019-10-04 VITALS — SYSTOLIC BLOOD PRESSURE: 161 MMHG | DIASTOLIC BLOOD PRESSURE: 73 MMHG | TEMPERATURE: 97.4 F

## 2019-10-04 DIAGNOSIS — Z99.2: ICD-10-CM

## 2019-10-04 DIAGNOSIS — N18.6: ICD-10-CM

## 2019-10-04 DIAGNOSIS — K59.09: Primary | ICD-10-CM

## 2019-10-04 DIAGNOSIS — G89.29: ICD-10-CM

## 2019-10-04 LAB
PLATELET # BLD: 214 K/UL (ref 152–353)
POTASSIUM SERPL-SCNC: 3 MMOL/L (ref 3.6–5.2)
SODIUM SERPL-SCNC: 137 MMOL/L (ref 136–145)

## 2019-10-04 PROCEDURE — 82272 OCCULT BLD FECES 1-3 TESTS: CPT

## 2019-10-04 PROCEDURE — 99283 EMERGENCY DEPT VISIT LOW MDM: CPT

## 2019-10-04 PROCEDURE — 80053 COMPREHEN METABOLIC PANEL: CPT

## 2019-10-04 PROCEDURE — 85027 COMPLETE CBC AUTOMATED: CPT

## 2020-03-18 ENCOUNTER — HOSPITAL ENCOUNTER (OUTPATIENT)
Dept: HOSPITAL 67 - LAB | Age: 80
Discharge: HOME | End: 2020-03-18
Attending: INTERNAL MEDICINE
Payer: COMMERCIAL

## 2020-03-18 DIAGNOSIS — D64.89: Primary | ICD-10-CM

## 2020-03-18 PROCEDURE — 85018 HEMOGLOBIN: CPT

## 2020-04-09 ENCOUNTER — HOSPITAL ENCOUNTER (OUTPATIENT)
Dept: HOSPITAL 67 - RAD | Age: 80
Discharge: HOME | End: 2020-04-09
Attending: FAMILY MEDICINE
Payer: COMMERCIAL

## 2020-04-09 DIAGNOSIS — M79.601: ICD-10-CM

## 2020-04-09 DIAGNOSIS — M25.511: Primary | ICD-10-CM

## 2020-04-09 DIAGNOSIS — M89.8X1: ICD-10-CM

## 2020-05-13 ENCOUNTER — HOSPITAL ENCOUNTER (EMERGENCY)
Dept: HOSPITAL 67 - ED | Age: 80
Discharge: HOME | End: 2020-05-13
Payer: COMMERCIAL

## 2020-05-13 VITALS — BODY MASS INDEX: 37.92 KG/M2 | WEIGHT: 214 LBS | HEIGHT: 63 IN

## 2020-05-13 VITALS — DIASTOLIC BLOOD PRESSURE: 64 MMHG | TEMPERATURE: 97.8 F | SYSTOLIC BLOOD PRESSURE: 136 MMHG
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
## 2020-05-13 DIAGNOSIS — S70.02XA: ICD-10-CM
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
## 2020-05-13 DIAGNOSIS — S40.012A: Primary | ICD-10-CM

## 2020-05-13 DIAGNOSIS — S30.0XXA: ICD-10-CM

## 2020-05-13 LAB — PLATELET # BLD: 162 K/UL (ref 152–353)

## 2020-05-13 PROCEDURE — 99283 EMERGENCY DEPT VISIT LOW MDM: CPT

## 2020-05-13 PROCEDURE — 85027 COMPLETE CBC AUTOMATED: CPT

## 2020-05-26 ENCOUNTER — HOSPITAL ENCOUNTER (OUTPATIENT)
Dept: HOSPITAL 67 - CT | Age: 80
Discharge: HOME | End: 2020-05-26
Attending: FAMILY MEDICINE
Payer: COMMERCIAL

## 2020-05-26 DIAGNOSIS — E66.9: ICD-10-CM

## 2020-05-26 DIAGNOSIS — I50.9: ICD-10-CM

## 2020-05-26 DIAGNOSIS — N18.6: ICD-10-CM

## 2020-05-26 DIAGNOSIS — R06.02: ICD-10-CM

## 2020-05-26 DIAGNOSIS — R07.81: Primary | ICD-10-CM

## 2020-05-26 DIAGNOSIS — J44.9: ICD-10-CM

## 2020-05-26 DIAGNOSIS — S29.8XXA: ICD-10-CM

## 2020-05-26 PROCEDURE — 84520 ASSAY OF UREA NITROGEN: CPT

## 2020-05-26 PROCEDURE — 36415 COLL VENOUS BLD VENIPUNCTURE: CPT

## 2020-05-26 PROCEDURE — 82565 ASSAY OF CREATININE: CPT

## 2020-08-26 ENCOUNTER — HOSPITAL ENCOUNTER (EMERGENCY)
Dept: HOSPITAL 67 - ED | Age: 80
LOS: 1 days | Discharge: HOME | End: 2020-08-27
Payer: COMMERCIAL

## 2020-08-26 VITALS — BODY MASS INDEX: 37.92 KG/M2 | HEIGHT: 63 IN | WEIGHT: 214 LBS

## 2020-08-26 DIAGNOSIS — N18.6: ICD-10-CM

## 2020-08-26 DIAGNOSIS — Z03.818: ICD-10-CM

## 2020-08-26 DIAGNOSIS — Z99.2: ICD-10-CM

## 2020-08-26 DIAGNOSIS — D72.828: ICD-10-CM

## 2020-08-26 DIAGNOSIS — J18.9: Primary | ICD-10-CM

## 2020-08-26 PROCEDURE — 99284 EMERGENCY DEPT VISIT MOD MDM: CPT

## 2020-08-26 PROCEDURE — 87040 BLOOD CULTURE FOR BACTERIA: CPT

## 2020-08-26 PROCEDURE — 85027 COMPLETE CBC AUTOMATED: CPT

## 2020-08-26 PROCEDURE — 87635 SARS-COV-2 COVID-19 AMP PRB: CPT

## 2020-08-26 PROCEDURE — 96375 TX/PRO/DX INJ NEW DRUG ADDON: CPT

## 2020-08-26 PROCEDURE — 85379 FIBRIN DEGRADATION QUANT: CPT

## 2020-08-26 PROCEDURE — U0003 INFECTIOUS AGENT DETECTION BY NUCLEIC ACID (DNA OR RNA); SEVERE ACUTE RESPIRATORY SYNDROME CORONAVIRUS 2 (SARS-COV-2) (CORONAVIRUS DISEASE [COVID-19]), AMPLIFIED PROBE TECHNIQUE, MAKING USE OF HIGH THROUGHPUT TECHNOLOGIES AS DESCRIBED BY CMS-2020-01-R: HCPCS

## 2020-08-26 PROCEDURE — 83605 ASSAY OF LACTIC ACID: CPT

## 2020-08-26 PROCEDURE — 96365 THER/PROPH/DIAG IV INF INIT: CPT

## 2020-08-26 PROCEDURE — 80053 COMPREHEN METABOLIC PANEL: CPT

## 2020-08-27 VITALS — DIASTOLIC BLOOD PRESSURE: 62 MMHG | SYSTOLIC BLOOD PRESSURE: 154 MMHG

## 2020-08-27 VITALS — TEMPERATURE: 99.2 F

## 2020-08-27 LAB
PLATELET # BLD: 215 K/UL (ref 152–353)
POTASSIUM SERPL-SCNC: 3.5 MMOL/L (ref 3.6–5.2)
SODIUM SERPL-SCNC: 134 MMOL/L (ref 136–145)

## 2020-09-29 ENCOUNTER — HOSPITAL ENCOUNTER (OUTPATIENT)
Dept: HOSPITAL 67 - RAD | Age: 80
Discharge: HOME | End: 2020-09-29
Attending: FAMILY MEDICINE
Payer: COMMERCIAL

## 2020-09-29 DIAGNOSIS — R05: Primary | ICD-10-CM

## 2021-02-04 ENCOUNTER — HOSPITAL ENCOUNTER (OUTPATIENT)
Dept: HOSPITAL 67 - CT | Age: 81
Discharge: HOME | End: 2021-02-04
Attending: FAMILY MEDICINE
Payer: COMMERCIAL

## 2021-02-04 DIAGNOSIS — K62.89: Primary | ICD-10-CM

## 2021-02-04 DIAGNOSIS — K59.00: ICD-10-CM

## 2021-02-04 DIAGNOSIS — E11.9: ICD-10-CM

## 2021-02-04 DIAGNOSIS — N18.6: ICD-10-CM

## 2021-02-04 DIAGNOSIS — J44.9: ICD-10-CM

## 2021-02-04 LAB
PLATELET # BLD: 178 K/UL (ref 152–353)
POTASSIUM SERPL-SCNC: 3.1 MMOL/L (ref 3.6–5.2)
SODIUM SERPL-SCNC: 139 MMOL/L (ref 136–145)

## 2021-02-04 PROCEDURE — 36415 COLL VENOUS BLD VENIPUNCTURE: CPT

## 2021-02-04 PROCEDURE — 84443 ASSAY THYROID STIM HORMONE: CPT

## 2021-02-04 PROCEDURE — 80053 COMPREHEN METABOLIC PANEL: CPT

## 2021-02-04 PROCEDURE — 85027 COMPLETE CBC AUTOMATED: CPT

## 2021-02-04 PROCEDURE — 84439 ASSAY OF FREE THYROXINE: CPT

## 2021-05-03 ENCOUNTER — HOSPITAL ENCOUNTER (EMERGENCY)
Dept: HOSPITAL 67 - ED | Age: 81
LOS: 1 days | Discharge: ANOTHER HOSPITAL | End: 2021-05-04
Payer: COMMERCIAL

## 2021-05-03 VITALS — HEIGHT: 63 IN | WEIGHT: 214 LBS | BODY MASS INDEX: 37.92 KG/M2

## 2021-05-03 VITALS — TEMPERATURE: 99.3 F

## 2021-05-03 DIAGNOSIS — E87.79: Primary | ICD-10-CM

## 2021-05-03 DIAGNOSIS — R06.09: ICD-10-CM

## 2021-05-03 LAB
PLATELET # BLD: 166 K/UL (ref 152–353)
POTASSIUM SERPL-SCNC: 3.1 MMOL/L (ref 3.6–5.2)
SODIUM SERPL-SCNC: 138 MMOL/L (ref 136–145)

## 2021-05-03 PROCEDURE — 96365 THER/PROPH/DIAG IV INF INIT: CPT

## 2021-05-03 PROCEDURE — 80053 COMPREHEN METABOLIC PANEL: CPT

## 2021-05-03 PROCEDURE — 99284 EMERGENCY DEPT VISIT MOD MDM: CPT

## 2021-05-03 PROCEDURE — 87635 SARS-COV-2 COVID-19 AMP PRB: CPT

## 2021-05-03 PROCEDURE — U0003 INFECTIOUS AGENT DETECTION BY NUCLEIC ACID (DNA OR RNA); SEVERE ACUTE RESPIRATORY SYNDROME CORONAVIRUS 2 (SARS-COV-2) (CORONAVIRUS DISEASE [COVID-19]), AMPLIFIED PROBE TECHNIQUE, MAKING USE OF HIGH THROUGHPUT TECHNOLOGIES AS DESCRIBED BY CMS-2020-01-R: HCPCS

## 2021-05-03 PROCEDURE — 85027 COMPLETE CBC AUTOMATED: CPT

## 2021-05-03 PROCEDURE — 36415 COLL VENOUS BLD VENIPUNCTURE: CPT

## 2021-05-04 VITALS — SYSTOLIC BLOOD PRESSURE: 175 MMHG | DIASTOLIC BLOOD PRESSURE: 69 MMHG

## 2021-07-13 ENCOUNTER — HOSPITAL ENCOUNTER (OUTPATIENT)
Dept: HOSPITAL 67 - LAB | Age: 81
Discharge: HOME | End: 2021-07-13
Attending: FAMILY MEDICINE
Payer: COMMERCIAL

## 2021-07-13 DIAGNOSIS — K21.9: ICD-10-CM

## 2021-07-13 DIAGNOSIS — E11.9: ICD-10-CM

## 2021-07-13 DIAGNOSIS — N18.6: ICD-10-CM

## 2021-07-13 DIAGNOSIS — S31.809A: Primary | ICD-10-CM

## 2021-07-13 DIAGNOSIS — I50.9: ICD-10-CM

## 2021-07-13 DIAGNOSIS — L89.329: ICD-10-CM

## 2021-07-13 DIAGNOSIS — I12.0: ICD-10-CM

## 2021-07-13 DIAGNOSIS — E03.8: ICD-10-CM

## 2021-07-13 DIAGNOSIS — E78.00: ICD-10-CM

## 2021-07-13 LAB
LDLC SERPL-MCNC: 47 MG/DL (ref 0–?)
PLATELET # BLD: 196 K/UL (ref 152–353)
POTASSIUM SERPL-SCNC: 3.9 MMOL/L (ref 3.6–5.2)
SODIUM SERPL-SCNC: 139 MMOL/L (ref 136–145)

## 2021-07-13 PROCEDURE — 84550 ASSAY OF BLOOD/URIC ACID: CPT

## 2021-07-13 PROCEDURE — 80053 COMPREHEN METABOLIC PANEL: CPT

## 2021-07-13 PROCEDURE — 84439 ASSAY OF FREE THYROXINE: CPT

## 2021-07-13 PROCEDURE — 83036 HEMOGLOBIN GLYCOSYLATED A1C: CPT

## 2021-07-13 PROCEDURE — 80061 LIPID PANEL: CPT

## 2021-07-13 PROCEDURE — 85027 COMPLETE CBC AUTOMATED: CPT

## 2021-07-13 PROCEDURE — 84443 ASSAY THYROID STIM HORMONE: CPT

## 2021-07-13 PROCEDURE — 82306 VITAMIN D 25 HYDROXY: CPT

## 2021-07-13 PROCEDURE — 83735 ASSAY OF MAGNESIUM: CPT

## 2021-10-08 ENCOUNTER — HOSPITAL ENCOUNTER (EMERGENCY)
Dept: HOSPITAL 67 - ED | Age: 81
LOS: 1 days | Discharge: HOME | End: 2021-10-09
Payer: COMMERCIAL

## 2021-10-08 VITALS — WEIGHT: 183 LBS | HEIGHT: 62 IN | BODY MASS INDEX: 33.68 KG/M2

## 2021-10-08 DIAGNOSIS — Z99.2: ICD-10-CM

## 2021-10-08 DIAGNOSIS — R55: ICD-10-CM

## 2021-10-08 DIAGNOSIS — R06.02: ICD-10-CM

## 2021-10-08 DIAGNOSIS — E86.0: ICD-10-CM

## 2021-10-08 DIAGNOSIS — E87.6: ICD-10-CM

## 2021-10-08 DIAGNOSIS — Z20.822: ICD-10-CM

## 2021-10-08 DIAGNOSIS — N18.6: Primary | ICD-10-CM

## 2021-10-08 LAB
APTT BLD: 27.9 SECONDS (ref 24.5–33.6)
PLATELET # BLD: 189 K/UL (ref 152–353)
POTASSIUM SERPL-SCNC: 2.9 MMOL/L (ref 3.6–5.2)
SODIUM SERPL-SCNC: 136 MMOL/L (ref 136–145)

## 2021-10-08 PROCEDURE — 84484 ASSAY OF TROPONIN QUANT: CPT

## 2021-10-08 PROCEDURE — 85730 THROMBOPLASTIN TIME PARTIAL: CPT

## 2021-10-08 PROCEDURE — 93005 ELECTROCARDIOGRAM TRACING: CPT

## 2021-10-08 PROCEDURE — 85027 COMPLETE CBC AUTOMATED: CPT

## 2021-10-08 PROCEDURE — 99284 EMERGENCY DEPT VISIT MOD MDM: CPT

## 2021-10-08 PROCEDURE — 87635 SARS-COV-2 COVID-19 AMP PRB: CPT

## 2021-10-08 PROCEDURE — 80320 DRUG SCREEN QUANTALCOHOLS: CPT

## 2021-10-08 PROCEDURE — 96360 HYDRATION IV INFUSION INIT: CPT

## 2021-10-08 PROCEDURE — 83880 ASSAY OF NATRIURETIC PEPTIDE: CPT

## 2021-10-08 PROCEDURE — 82550 ASSAY OF CK (CPK): CPT

## 2021-10-08 PROCEDURE — 80053 COMPREHEN METABOLIC PANEL: CPT

## 2021-10-08 PROCEDURE — 36415 COLL VENOUS BLD VENIPUNCTURE: CPT

## 2021-10-08 PROCEDURE — 85610 PROTHROMBIN TIME: CPT

## 2021-10-08 PROCEDURE — U0003 INFECTIOUS AGENT DETECTION BY NUCLEIC ACID (DNA OR RNA); SEVERE ACUTE RESPIRATORY SYNDROME CORONAVIRUS 2 (SARS-COV-2) (CORONAVIRUS DISEASE [COVID-19]), AMPLIFIED PROBE TECHNIQUE, MAKING USE OF HIGH THROUGHPUT TECHNOLOGIES AS DESCRIBED BY CMS-2020-01-R: HCPCS

## 2021-10-09 VITALS — SYSTOLIC BLOOD PRESSURE: 136 MMHG | DIASTOLIC BLOOD PRESSURE: 72 MMHG | TEMPERATURE: 99 F

## 2021-12-30 NOTE — NUR
Department of Anesthesiology  Postprocedure Note    Patient: Caitlin Aldana  MRN: [id number]  YOB: 1965  Date of evaluation: 12/30/2021  Time:  6:13 PM     Procedure Summary     Date: 12/30/21 Room / Location: [address] [place] [address]    Anesthesia Start: 2621 Anesthesia Stop: 7462    Procedure: REPLACEMENT OF SPINAL CORD STIMULATOR BATTERY WITH ADAPTORS (N/A ) Diagnosis:       Lumbar radiculopathy      (Left Lumbar radiculopathy [M54.16])    Surgeons: Rosaline Draper MD Responsible Provider: Rei Leon MD    Anesthesia Type: MAC ASA Status: 3          Anesthesia Type: MAC    Kelly Phase I: Kelly Score: 10    Kelly Phase II: Kelly Score: 10    Last vitals: Reviewed and per EMR flowsheets.        Anesthesia Post Evaluation    Patient location during evaluation: PACU  Patient participation: complete - patient participated  Level of consciousness: awake and alert  Pain score: 3  Airway patency: patent  Nausea & Vomiting: no nausea and no vomiting  Cardiovascular status: blood pressure returned to baseline  Respiratory status: acceptable  Hydration status: euvolemic PT  REPOSITIONED RIGHT SIDE.  RESP DEPT  HERE  ECHO DONE.  REPOSITIONED IN
BED.

## 2022-09-15 ENCOUNTER — HOSPITAL ENCOUNTER (EMERGENCY)
Dept: HOSPITAL 67 - ED | Age: 82
Discharge: HOME | End: 2022-09-15
Payer: COMMERCIAL

## 2022-09-15 VITALS — SYSTOLIC BLOOD PRESSURE: 147 MMHG | DIASTOLIC BLOOD PRESSURE: 57 MMHG | TEMPERATURE: 98.9 F

## 2022-09-15 VITALS — WEIGHT: 189 LBS | HEIGHT: 62 IN | BODY MASS INDEX: 34.78 KG/M2

## 2022-09-15 DIAGNOSIS — R07.89: Primary | ICD-10-CM

## 2022-09-15 LAB
APTT BLD: 33.6 SECONDS (ref 24.5–33.6)
PLATELET # BLD: 182 K/UL (ref 152–353)
POTASSIUM SERPL-SCNC: 3.3 MMOL/L (ref 3.6–5.2)
SODIUM SERPL-SCNC: 134 MMOL/L (ref 136–145)

## 2022-09-15 PROCEDURE — 84484 ASSAY OF TROPONIN QUANT: CPT

## 2022-09-15 PROCEDURE — 85610 PROTHROMBIN TIME: CPT

## 2022-09-15 PROCEDURE — 85027 COMPLETE CBC AUTOMATED: CPT

## 2022-09-15 PROCEDURE — 96372 THER/PROPH/DIAG INJ SC/IM: CPT

## 2022-09-15 PROCEDURE — 80053 COMPREHEN METABOLIC PANEL: CPT

## 2022-09-15 PROCEDURE — 93005 ELECTROCARDIOGRAM TRACING: CPT

## 2022-09-15 PROCEDURE — 85730 THROMBOPLASTIN TIME PARTIAL: CPT

## 2022-09-15 PROCEDURE — 99283 EMERGENCY DEPT VISIT LOW MDM: CPT

## 2022-09-15 PROCEDURE — 83880 ASSAY OF NATRIURETIC PEPTIDE: CPT

## 2023-02-03 ENCOUNTER — HOSPITAL ENCOUNTER (EMERGENCY)
Dept: HOSPITAL 67 - ED | Age: 83
Discharge: HOME | End: 2023-02-03
Payer: COMMERCIAL

## 2023-02-03 VITALS — TEMPERATURE: 98 F | DIASTOLIC BLOOD PRESSURE: 72 MMHG | SYSTOLIC BLOOD PRESSURE: 184 MMHG

## 2023-02-03 VITALS — BODY MASS INDEX: 30.65 KG/M2 | WEIGHT: 173 LBS | HEIGHT: 63 IN

## 2023-02-03 DIAGNOSIS — R05.8: ICD-10-CM

## 2023-02-03 DIAGNOSIS — F17.220: ICD-10-CM

## 2023-02-03 DIAGNOSIS — E87.6: Primary | ICD-10-CM

## 2023-02-03 DIAGNOSIS — Z99.2: ICD-10-CM

## 2023-02-03 DIAGNOSIS — J44.1: ICD-10-CM

## 2023-02-03 DIAGNOSIS — N18.6: ICD-10-CM

## 2023-02-03 LAB
PLATELET # BLD: 154 K/UL (ref 152–353)
POTASSIUM SERPL-SCNC: 3.2 MMOL/L (ref 3.6–5.2)
SODIUM SERPL-SCNC: 133 MMOL/L (ref 136–145)

## 2023-02-03 PROCEDURE — 99283 EMERGENCY DEPT VISIT LOW MDM: CPT

## 2023-02-03 PROCEDURE — 85027 COMPLETE CBC AUTOMATED: CPT

## 2023-02-03 PROCEDURE — 93005 ELECTROCARDIOGRAM TRACING: CPT

## 2023-02-03 PROCEDURE — 80053 COMPREHEN METABOLIC PANEL: CPT

## 2023-02-03 PROCEDURE — 85379 FIBRIN DEGRADATION QUANT: CPT

## 2023-02-03 PROCEDURE — 84484 ASSAY OF TROPONIN QUANT: CPT

## 2023-02-03 PROCEDURE — 36415 COLL VENOUS BLD VENIPUNCTURE: CPT

## 2023-02-03 PROCEDURE — 87502 INFLUENZA DNA AMP PROBE: CPT

## 2023-02-07 ENCOUNTER — HOSPITAL ENCOUNTER (EMERGENCY)
Dept: HOSPITAL 67 - ED | Age: 83
Discharge: ANOTHER HOSPITAL | End: 2023-02-07
Payer: COMMERCIAL

## 2023-02-07 VITALS — DIASTOLIC BLOOD PRESSURE: 80 MMHG | SYSTOLIC BLOOD PRESSURE: 170 MMHG

## 2023-02-07 VITALS — WEIGHT: 173 LBS | BODY MASS INDEX: 30.65 KG/M2 | HEIGHT: 63 IN

## 2023-02-07 VITALS — TEMPERATURE: 97.4 F

## 2023-02-07 DIAGNOSIS — G82.50: ICD-10-CM

## 2023-02-07 DIAGNOSIS — N18.6: ICD-10-CM

## 2023-02-07 DIAGNOSIS — Z99.2: ICD-10-CM

## 2023-02-07 DIAGNOSIS — I50.1: ICD-10-CM

## 2023-02-07 DIAGNOSIS — J44.9: ICD-10-CM

## 2023-02-07 DIAGNOSIS — Z99.81: ICD-10-CM

## 2023-02-07 DIAGNOSIS — Z11.52: ICD-10-CM

## 2023-02-07 DIAGNOSIS — J69.0: Primary | ICD-10-CM

## 2023-02-07 DIAGNOSIS — F17.220: ICD-10-CM

## 2023-02-07 DIAGNOSIS — E87.6: ICD-10-CM

## 2023-02-07 LAB
PLATELET # BLD: 201 K/UL (ref 152–353)
POTASSIUM SERPL-SCNC: 3.1 MMOL/L (ref 3.6–5.2)
SODIUM SERPL-SCNC: 136 MMOL/L (ref 136–145)

## 2023-02-07 PROCEDURE — 87635 SARS-COV-2 COVID-19 AMP PRB: CPT

## 2023-02-07 PROCEDURE — U0003 INFECTIOUS AGENT DETECTION BY NUCLEIC ACID (DNA OR RNA); SEVERE ACUTE RESPIRATORY SYNDROME CORONAVIRUS 2 (SARS-COV-2) (CORONAVIRUS DISEASE [COVID-19]), AMPLIFIED PROBE TECHNIQUE, MAKING USE OF HIGH THROUGHPUT TECHNOLOGIES AS DESCRIBED BY CMS-2020-01-R: HCPCS

## 2023-02-07 PROCEDURE — 99285 EMERGENCY DEPT VISIT HI MDM: CPT

## 2023-02-07 PROCEDURE — 87040 BLOOD CULTURE FOR BACTERIA: CPT

## 2023-02-07 PROCEDURE — 85027 COMPLETE CBC AUTOMATED: CPT

## 2023-02-07 PROCEDURE — 83880 ASSAY OF NATRIURETIC PEPTIDE: CPT

## 2023-02-07 PROCEDURE — 84484 ASSAY OF TROPONIN QUANT: CPT

## 2023-02-07 PROCEDURE — 96365 THER/PROPH/DIAG IV INF INIT: CPT

## 2023-02-07 PROCEDURE — 80048 BASIC METABOLIC PNL TOTAL CA: CPT

## 2023-06-30 ENCOUNTER — HOSPITAL ENCOUNTER (EMERGENCY)
Dept: HOSPITAL 67 - ED | Age: 83
Discharge: NURSING, REHAB OR LONG-TERM CARE | End: 2023-06-30
Payer: COMMERCIAL

## 2023-06-30 VITALS — WEIGHT: 162 LBS | TEMPERATURE: 98.5 F | BODY MASS INDEX: 27.66 KG/M2 | HEIGHT: 64 IN

## 2023-06-30 VITALS — SYSTOLIC BLOOD PRESSURE: 106 MMHG | DIASTOLIC BLOOD PRESSURE: 46 MMHG

## 2023-06-30 DIAGNOSIS — E86.1: Primary | ICD-10-CM

## 2023-06-30 DIAGNOSIS — R07.9: ICD-10-CM

## 2023-06-30 LAB
APTT BLD: 43.2 SECONDS (ref 23.9–36.7)
PLATELET # BLD: 183 K/UL (ref 152–353)
POTASSIUM SERPL-SCNC: 3.6 MMOL/L (ref 3.6–5.2)
SODIUM SERPL-SCNC: 134 MMOL/L (ref 136–145)

## 2023-06-30 PROCEDURE — 85027 COMPLETE CBC AUTOMATED: CPT

## 2023-06-30 PROCEDURE — 85730 THROMBOPLASTIN TIME PARTIAL: CPT

## 2023-06-30 PROCEDURE — 84484 ASSAY OF TROPONIN QUANT: CPT

## 2023-06-30 PROCEDURE — 93005 ELECTROCARDIOGRAM TRACING: CPT

## 2023-06-30 PROCEDURE — 85610 PROTHROMBIN TIME: CPT

## 2023-06-30 PROCEDURE — 80053 COMPREHEN METABOLIC PANEL: CPT

## 2023-06-30 PROCEDURE — 36415 COLL VENOUS BLD VENIPUNCTURE: CPT

## 2023-06-30 PROCEDURE — 82550 ASSAY OF CK (CPK): CPT

## 2023-06-30 PROCEDURE — 99284 EMERGENCY DEPT VISIT MOD MDM: CPT
